# Patient Record
Sex: MALE | Race: WHITE | NOT HISPANIC OR LATINO | Employment: FULL TIME | URBAN - METROPOLITAN AREA
[De-identification: names, ages, dates, MRNs, and addresses within clinical notes are randomized per-mention and may not be internally consistent; named-entity substitution may affect disease eponyms.]

---

## 2017-06-19 ENCOUNTER — OFFICE VISIT (OUTPATIENT)
Dept: FAMILY MEDICINE CLINIC | Facility: CLINIC | Age: 32
End: 2017-06-19

## 2017-06-19 VITALS
SYSTOLIC BLOOD PRESSURE: 136 MMHG | BODY MASS INDEX: 37.25 KG/M2 | DIASTOLIC BLOOD PRESSURE: 82 MMHG | TEMPERATURE: 98 F | HEIGHT: 72 IN | WEIGHT: 275 LBS | HEART RATE: 83 BPM

## 2017-06-19 DIAGNOSIS — M10.071 ACUTE IDIOPATHIC GOUT INVOLVING TOE OF RIGHT FOOT: Primary | ICD-10-CM

## 2017-06-19 PROCEDURE — 99203 OFFICE O/P NEW LOW 30 MIN: CPT | Performed by: INTERNAL MEDICINE

## 2017-06-19 RX ORDER — INDOMETHACIN 50 MG/1
50 CAPSULE ORAL
Qty: 30 CAPSULE | Refills: 2 | Status: SHIPPED | OUTPATIENT
Start: 2017-06-19 | End: 2019-08-08

## 2017-06-19 NOTE — PROGRESS NOTES
Subjective   Marcus Flores is a 32 y.o. male. Patient is here today for   Chief Complaint   Patient presents with   • Gout     left big toe-new patient           Vitals:    06/19/17 0948   BP: 136/82   Pulse:    Temp:        History reviewed. No pertinent past medical history.   No Known Allergies   Social History     Social History   • Marital status: Unknown     Spouse name: N/A   • Number of children: N/A   • Years of education: N/A     Occupational History   • Not on file.     Social History Main Topics   • Smoking status: Never Smoker   • Smokeless tobacco: Not on file   • Alcohol use No   • Drug use: Not on file   • Sexual activity: Not on file     Other Topics Concern   • Not on file     Social History Narrative   • No narrative on file        Current Outpatient Prescriptions:   •  indomethacin (INDOCIN) 50 MG capsule, Take 1 capsule by mouth 3 (Three) Times a Day With Meals., Disp: 30 capsule, Rfl: 2     Objective     HPI Comments: This young man felt like he might have had a bout of gout.  His left medical tarsal phalangeal joint on his great toe is exquisitely tender about 3 weeks ago.  It is less so now.  It never really did get really red.  It did swell a bit.  The swelling is also decreased bed.    He tells me his dad has had trouble with gout in the past.    Gout          Review of Systems   Constitutional: Negative.    HENT: Negative.    Respiratory: Negative.    Cardiovascular: Negative.    Musculoskeletal: Positive for gout.        The only complaint of metatarsal phalangeal joint and the great toe of the left foot having had exquisite pain about 3 weeks ago which is gradually diminished although he continues to her little bit.   Psychiatric/Behavioral: Negative.        Physical Exam   Constitutional: He is oriented to person, place, and time.   Pleasant, neatly groomed, BMI 37.   HENT:   Head: Normocephalic and atraumatic.   Pulmonary/Chest: Effort normal.   Musculoskeletal:   He had a little bit  of erythema and the metatarsal-phalangeal joint of the great toe on the left foot.  A full range of motion here.  There was a bit of pain on palpation but this was not exquisite.  He ambulated without lab.   Neurological: He is alert and oriented to person, place, and time.   Psychiatric: He has a normal mood and affect. His behavior is normal.   Nursing note and vitals reviewed.        Problem List Items Addressed This Visit        Other    Acute idiopathic gout involving toe of right foot - Primary    Relevant Orders    Comprehensive Metabolic Panel    Uric Acid    CBC & Differential            PLAN  I sent a prescription for indomethacin 50 mg 1 by mouth 3 times a day with food when necessary.  I asked him not to take this medication for more than 1-2 weeks at a time when necessary.    He will let me know if his symptoms flare.  In the meantime, I got a uric acid level and a comprehensive metabolic panel.  No Follow-up on file.

## 2017-06-20 LAB
ALBUMIN SERPL-MCNC: 5 G/DL (ref 3.5–5.2)
ALBUMIN/GLOB SERPL: 1.6 G/DL
ALP SERPL-CCNC: 48 U/L (ref 39–117)
ALT SERPL-CCNC: 93 U/L (ref 1–41)
AST SERPL-CCNC: 37 U/L (ref 1–40)
BASOPHILS # BLD AUTO: 0.03 10*3/MM3 (ref 0–0.2)
BASOPHILS NFR BLD AUTO: 0.4 % (ref 0–1.5)
BILIRUB SERPL-MCNC: 0.2 MG/DL (ref 0.1–1.2)
BUN SERPL-MCNC: 11 MG/DL (ref 6–20)
BUN/CREAT SERPL: 10.5 (ref 7–25)
CALCIUM SERPL-MCNC: 10.1 MG/DL (ref 8.6–10.5)
CHLORIDE SERPL-SCNC: 101 MMOL/L (ref 98–107)
CO2 SERPL-SCNC: 26.5 MMOL/L (ref 22–29)
CREAT SERPL-MCNC: 1.05 MG/DL (ref 0.76–1.27)
EOSINOPHIL # BLD AUTO: 0.36 10*3/MM3 (ref 0–0.7)
EOSINOPHIL NFR BLD AUTO: 5.2 % (ref 0.3–6.2)
ERYTHROCYTE [DISTWIDTH] IN BLOOD BY AUTOMATED COUNT: 13.3 % (ref 11.5–14.5)
GLOBULIN SER CALC-MCNC: 3.1 GM/DL
GLUCOSE SERPL-MCNC: 107 MG/DL (ref 65–99)
HCT VFR BLD AUTO: 48.9 % (ref 40.4–52.2)
HGB BLD-MCNC: 16 G/DL (ref 13.7–17.6)
IMM GRANULOCYTES # BLD: 0 10*3/MM3 (ref 0–0.03)
IMM GRANULOCYTES NFR BLD: 0 % (ref 0–0.5)
LYMPHOCYTES # BLD AUTO: 3.06 10*3/MM3 (ref 0.9–4.8)
LYMPHOCYTES NFR BLD AUTO: 44.1 % (ref 19.6–45.3)
MCH RBC QN AUTO: 31.1 PG (ref 27–32.7)
MCHC RBC AUTO-ENTMCNC: 32.7 G/DL (ref 32.6–36.4)
MCV RBC AUTO: 95.1 FL (ref 79.8–96.2)
MONOCYTES # BLD AUTO: 0.44 10*3/MM3 (ref 0.2–1.2)
MONOCYTES NFR BLD AUTO: 6.3 % (ref 5–12)
NEUTROPHILS # BLD AUTO: 3.05 10*3/MM3 (ref 1.9–8.1)
NEUTROPHILS NFR BLD AUTO: 44 % (ref 42.7–76)
PLATELET # BLD AUTO: 281 10*3/MM3 (ref 140–500)
POTASSIUM SERPL-SCNC: 4.6 MMOL/L (ref 3.5–5.2)
PROT SERPL-MCNC: 8.1 G/DL (ref 6–8.5)
RBC # BLD AUTO: 5.14 10*6/MM3 (ref 4.6–6)
SODIUM SERPL-SCNC: 142 MMOL/L (ref 136–145)
URATE SERPL-MCNC: 7 MG/DL (ref 3.4–7)
WBC # BLD AUTO: 6.94 10*3/MM3 (ref 4.5–10.7)

## 2017-11-02 ENCOUNTER — OFFICE VISIT (OUTPATIENT)
Dept: FAMILY MEDICINE CLINIC | Facility: CLINIC | Age: 32
End: 2017-11-02

## 2017-11-02 VITALS
DIASTOLIC BLOOD PRESSURE: 72 MMHG | HEIGHT: 72 IN | TEMPERATURE: 98 F | OXYGEN SATURATION: 98 % | BODY MASS INDEX: 37.93 KG/M2 | HEART RATE: 80 BPM | RESPIRATION RATE: 16 BRPM | SYSTOLIC BLOOD PRESSURE: 124 MMHG | WEIGHT: 280 LBS

## 2017-11-02 DIAGNOSIS — G47.10 HYPERSOMNIA: Primary | ICD-10-CM

## 2017-11-02 DIAGNOSIS — M10.071 ACUTE IDIOPATHIC GOUT INVOLVING TOE OF RIGHT FOOT: ICD-10-CM

## 2017-11-02 DIAGNOSIS — Z23 NEED FOR VACCINATION: ICD-10-CM

## 2017-11-02 DIAGNOSIS — R06.81 WITNESSED EPISODE OF APNEA: ICD-10-CM

## 2017-11-02 PROCEDURE — 99213 OFFICE O/P EST LOW 20 MIN: CPT | Performed by: INTERNAL MEDICINE

## 2017-11-02 PROCEDURE — 90715 TDAP VACCINE 7 YRS/> IM: CPT | Performed by: INTERNAL MEDICINE

## 2017-11-02 PROCEDURE — 90471 IMMUNIZATION ADMIN: CPT | Performed by: INTERNAL MEDICINE

## 2017-11-02 RX ORDER — MELOXICAM 7.5 MG/1
15 TABLET ORAL DAILY
Qty: 60 TABLET | Refills: 1 | Status: SHIPPED | OUTPATIENT
Start: 2017-11-02 | End: 2019-08-08

## 2017-11-02 NOTE — PROGRESS NOTES
Subjective   Marcus Flores is a 32 y.o. male. Patient is here today for   Chief Complaint   Patient presents with   • Gout     right toe pain   • Snoring     would like to discuss getting a sleep study done           Vitals:    11/02/17 0952   BP: 124/72   Pulse: 80   Resp: 16   Temp: 98 °F (36.7 °C)   SpO2: 98%       History reviewed. No pertinent past medical history.   No Known Allergies   Social History     Social History   • Marital status: Unknown     Spouse name: N/A   • Number of children: N/A   • Years of education: N/A     Occupational History   • Not on file.     Social History Main Topics   • Smoking status: Never Smoker   • Smokeless tobacco: Not on file   • Alcohol use No   • Drug use: Not on file   • Sexual activity: Not on file     Other Topics Concern   • Not on file     Social History Narrative        Current Outpatient Prescriptions:   •  indomethacin (INDOCIN) 50 MG capsule, Take 1 capsule by mouth 3 (Three) Times a Day With Meals., Disp: 30 capsule, Rfl: 2  •  meloxicam (MOBIC) 7.5 MG tablet, Take 2 tablets by mouth Daily., Disp: 60 tablet, Rfl: 1     Objective     HPI Comments: He tells me that his girlfriend tells him that he snores and is concerned that he may have obstructive sleep apnea.  He believes that she has witnessed some apnea test.    He notes that he occasionally has pain in his metacarpophalangeal joint of the big toe on left foot.  His been taking indomethacin 1 tablet 2 or 3 times a week since a saw and treated him for gout a few months ago.        Gout   Associated symptoms include fatigue.   Snoring   Associated symptoms include fatigue.        Review of Systems   Constitutional: Positive for fatigue.   Respiratory: Positive for apnea and snoring.    Cardiovascular: Negative.    Genitourinary: Negative.    Musculoskeletal: Positive for gout.        He has occasional pain in the metacarpophalangeal joint of the first of the left foot.   Psychiatric/Behavioral: Negative.         Physical Exam   Constitutional: He is oriented to person, place, and time. He appears well-developed and well-nourished.   Pleasant, neatly groomed, BMI 37.   HENT:   Head: Normocephalic and atraumatic.   Pulmonary/Chest: Effort normal.   Musculoskeletal:   He has some pain on PALPATION of the metacarpophalangeal joint of the first toe on left foot.  He has enlargement of his joint is well   Neurological: He is alert and oriented to person, place, and time.   Psychiatric: He has a normal mood and affect. His behavior is normal.   Nursing note and vitals reviewed.        Problem List Items Addressed This Visit        Respiratory    Witnessed episode of apnea       Musculoskeletal and Integument    Acute idiopathic gout involving toe of right foot      Other Visit Diagnoses     Hypersomnia    -  Primary    Relevant Orders    Ambulatory Referral to Sleep Medicine    Need for vaccination        Relevant Orders    Tdap Vaccine Greater Than or Equal To 8yo IM (Completed)            PLAN  I'm going to arrange for him to get a an old sleep study.    He may have gout in his left great toe but I think it's also just as likely that he has begun to develop some osteoarthritis in the same joint.  I've given him a prescription for meloxicam 7.5 mg one to 2 tablets daily when necessary.    I like him to follow-up to discuss the results of his sleep study when they are available.  No Follow-up on file.

## 2018-01-02 RX ORDER — MELOXICAM 15 MG/1
15 TABLET ORAL DAILY
Qty: 90 TABLET | Refills: 0 | Status: SHIPPED | OUTPATIENT
Start: 2018-01-02 | End: 2019-08-08

## 2019-08-08 ENCOUNTER — OFFICE VISIT (OUTPATIENT)
Dept: FAMILY MEDICINE CLINIC | Facility: CLINIC | Age: 34
End: 2019-08-08

## 2019-08-08 VITALS
SYSTOLIC BLOOD PRESSURE: 144 MMHG | DIASTOLIC BLOOD PRESSURE: 108 MMHG | BODY MASS INDEX: 35.24 KG/M2 | TEMPERATURE: 98.6 F | WEIGHT: 274.6 LBS | RESPIRATION RATE: 16 BRPM | HEIGHT: 74 IN

## 2019-08-08 DIAGNOSIS — R06.83 SNORING: Primary | ICD-10-CM

## 2019-08-08 DIAGNOSIS — R06.81 WITNESSED EPISODE OF APNEA: ICD-10-CM

## 2019-08-08 DIAGNOSIS — M25.552 LEFT HIP PAIN: ICD-10-CM

## 2019-08-08 PROCEDURE — 99213 OFFICE O/P EST LOW 20 MIN: CPT | Performed by: INTERNAL MEDICINE

## 2019-08-08 RX ORDER — DICLOFENAC POTASSIUM 50 MG/1
50 TABLET, FILM COATED ORAL 2 TIMES DAILY
Qty: 60 TABLET | Refills: 1 | Status: SHIPPED | OUTPATIENT
Start: 2019-08-08 | End: 2020-07-17 | Stop reason: SDUPTHER

## 2019-08-16 PROBLEM — M25.552 LEFT HIP PAIN: Status: ACTIVE | Noted: 2019-08-16

## 2019-08-16 NOTE — PROGRESS NOTES
Subjective   Marcus Flores is a 34 y.o. male. Patient is here today for   Chief Complaint   Patient presents with   • Snoring     pt states has been going on for 3 yrs, sleep study order [facility] in office sleep study preferred    • HIP DISCOMFORT     PT STATES HAVING LEFT HIP DISCOMFORT, been going on for 2-3 months           Vitals:    08/08/19 1321   BP: (!) 144/108   Resp: 16   Temp: 98.6 °F (37 °C)       History reviewed. No pertinent past medical history.   No Known Allergies   Social History     Socioeconomic History   • Marital status: Unknown     Spouse name: Not on file   • Number of children: Not on file   • Years of education: Not on file   • Highest education level: Not on file   Tobacco Use   • Smoking status: Never Smoker   • Smokeless tobacco: Never Used   Substance and Sexual Activity   • Alcohol use: No        Current Outpatient Medications:   •  diclofenac (CATAFLAM) 50 MG tablet, Take 1 tablet by mouth 2 (Two) Times a Day., Disp: 60 tablet, Rfl: 1     Objective     Complains of left hip pain for months.    He has been told that he snores loudly.  He has been told that he stops breathing when he is sleeping.  He is constantly tired.         Review of Systems   Constitutional: Positive for fatigue.   Respiratory: Negative.    Cardiovascular: Negative.    Musculoskeletal:        Left hip pain.       Physical Exam   Constitutional: He is oriented to person, place, and time. He appears well-developed and well-nourished.   , Neatly groomed, BMI 35.   HENT:   Head: Normocephalic and atraumatic.   Cardiovascular: Normal rate, regular rhythm and normal heart sounds.   Pulmonary/Chest: Effort normal and breath sounds normal.   Musculoskeletal:   He had some pain on palpation of the left greater trochanter.   Neurological: He is alert and oriented to person, place, and time.   Psychiatric: He has a normal mood and affect. His behavior is normal.   Nursing note and vitals reviewed.        Problem List Items  Addressed This Visit        Respiratory    Witnessed episode of apnea    Relevant Orders    Ambulatory Referral to Sleep Medicine       Nervous and Auditory    Left hip pain      Other Visit Diagnoses     Snoring    -  Primary    Relevant Orders    Ambulatory Referral to Sleep Medicine            PLAN  , Set him up to get a sleep study.    I have asked him to take diclofenac twice daily as needed for his left hip pain which is probably greater trochanteric bursitis.    His blood pressure is a bit elevated today.  At like to have him back to discuss the results of his sleep study when it is available.  No Follow-up on file.

## 2019-08-22 ENCOUNTER — OFFICE VISIT (OUTPATIENT)
Dept: SLEEP MEDICINE | Facility: HOSPITAL | Age: 34
End: 2019-08-22

## 2019-08-22 VITALS
DIASTOLIC BLOOD PRESSURE: 81 MMHG | WEIGHT: 272.6 LBS | HEART RATE: 81 BPM | SYSTOLIC BLOOD PRESSURE: 139 MMHG | OXYGEN SATURATION: 97 % | HEIGHT: 72 IN | BODY MASS INDEX: 36.92 KG/M2

## 2019-08-22 DIAGNOSIS — G47.10 HYPERSOMNIA: Primary | ICD-10-CM

## 2019-08-22 DIAGNOSIS — R06.81 WITNESSED EPISODE OF APNEA: ICD-10-CM

## 2019-08-22 DIAGNOSIS — R06.83 SNORING: ICD-10-CM

## 2019-08-22 PROBLEM — G47.30 OBSERVED SLEEP APNEA: Status: ACTIVE | Noted: 2017-11-02

## 2019-08-22 PROCEDURE — 99244 OFF/OP CNSLTJ NEW/EST MOD 40: CPT | Performed by: INTERNAL MEDICINE

## 2019-08-22 PROCEDURE — G0463 HOSPITAL OUTPT CLINIC VISIT: HCPCS

## 2019-08-22 NOTE — PROGRESS NOTES
[facility]  [address]  [address] [place] [address]  Phone   Fax       Marcus Flores  1985  34 y.o.  male      PCP:Owen Arellano MD  Referring Provider same    Type of service: Initial consult  Date of service: 8/22/2019      Chief Complaint   Patient presents with   • Daytime Sleepiness     sometimes   • Fatigue   • Snoring   • Witnessed Apnea   • Frequent Awakenings     awakens coughing/gasping   • Non-restorative Sleep     restless sleep       History of present illness;  Thank you for asking to see Marcus Flores, 34 y.o.  for evaluation of sleep apnea. The patient was seen today on 8/22/2019 at [facility].  He works for [employer].  He has been told that he has significant snoring and also apnea by his girlfriend.  He feels tired and not rested when he wakes up.    Patient gives the following sleep history.  Sleep schedule:  Bedtime: 1130 PM  Wake time: 8 AM  Normally takes about 20 minutes to fall asleep  Average hours of sleep 8  Number of naps per day 1  When patient wakes up still feels tired and not rested  Snoring yes  Witnessed apneas yes  Have you ever awakened gasping for breath, coughing, choking: Yes      History reviewed. No pertinent past medical history.    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  Tobacco use: No  Alcohol use: 2 to 3/week  Caffeinated drinks: 2-3 energy drinks  Over-the-counter sleeping aid and medications: No  Narcotic medications: No    Family Hx  Family history of sleep apnea brother has sleep apnea  Family History   Problem Relation Age of Onset   • Heart disease Mother    • Hypertension Mother    • Cancer Father    • Glaucoma Father        Medications: reviewed    Current Outpatient Medications:   •  diclofenac (CATAFLAM) 50 MG tablet, Take 1 tablet by mouth 2 (Two) Times a Day., Disp: 60 tablet, Rfl: 1    Review of systems:  [place] Sleepiness Scale: Total score: 4   Positive for snoring, witnessed apneas,  "fatigue and daytime excessive sleepiness,   Negative for shortness of breath, cough, wheezing, chest pain, nausea and vomiting, palpitation, swelling of feet:    Morning headaches: No  Awaken with sore throat or dry mouth : Yes  Leg jerking at night: No  Crawly feeling/urge sensation to move in the legs: No  Teeth grinding: No  Sleepwalking, nightmares, muscle weakness with laughing or anger,sleep paralysis: No  Nasal Congestion: No  Nocturia (how many times/night): 1  Memory Problem: No    Physical exam:  Vitals:    08/22/19 1100   BP: 139/81   BP Location: Left arm   Patient Position: Sitting   Pulse: 81   SpO2: 97%   Weight: 124 kg (272 lb 9.6 oz)   Height: 182.9 cm (72\")    Body mass index is 36.97 kg/m². Neck Circumference: 19 inches  HEENT: Head is atraumatic, normocephalic   Eyes:pupils are round equal and reacting to light and accommodation, conjunctiva normal  Nose:no nasal septal defects or deviation and the nasal passages are clear, no nasal polyps,   Throat: tonsils are not enlarged, tongue normal size, oral airway Mallampati class 3  NECK: No lymphadenopathy, trachea is in the midline, thyroid not enlarged  RESPIRATORY SYSTEM: Breath sounds are equal on both sides, there are no wheezes or crackles  CARDIOVASULAR SYSTEM: Heart sounds are regular rhythm and quan rate, there are no murmurs or thrills  ABDOMEN: Soft, no hepatosplenomegaly, no evidence of ascites  EXTREMITES: No cyanosis, clubbing or edema   NEUROLOGICAL SYSTEM: Oriented x 3, no gross neurological defects, gait normal    Medical records and labs reviewed in [facility] reviewed    Assessment and plan:  · Sleep apnea unspecified, (G47.30) Patient's symptoms and examination is consistent with sleep apnea.  I have talked to the patient about the signs and symptoms of sleep apnea and consequences of untreated sleep apnea. Discussed sleep testing.  I have placed a order in epic for home sleep test.  Patient will have a follow-up after this sleep test " is done.   · Obesity, patient's BMI is Body mass index is 36.97 kg/m².. I have discussed the relationship between weight and sleep apnea. Weight reduction is encouraged.  I will also discussed with the patient diet and exercise.  · Snoring secondary to sleep apnea  · Daytime excessive sleepiness to sleep apnea            Guillaume Diana MD, [facility]  Sleep Medicine.(Board-certified)  [facility]  8/22/2019 ,

## 2019-09-24 ENCOUNTER — HOSPITAL ENCOUNTER (OUTPATIENT)
Dept: SLEEP MEDICINE | Facility: HOSPITAL | Age: 34
Discharge: HOME | End: 2019-09-24
Admitting: INTERNAL MEDICINE

## 2019-09-24 DIAGNOSIS — R06.83 SNORING: ICD-10-CM

## 2019-09-24 DIAGNOSIS — G47.10 HYPERSOMNIA: ICD-10-CM

## 2019-09-24 DIAGNOSIS — R06.81 WITNESSED EPISODE OF APNEA: ICD-10-CM

## 2019-09-24 PROCEDURE — 95806 SLEEP STUDY UNATT&RESP EFFT: CPT

## 2019-09-24 PROCEDURE — 95806 SLEEP STUDY UNATT&RESP EFFT: CPT | Performed by: INTERNAL MEDICINE

## 2019-09-27 ENCOUNTER — TELEPHONE (OUTPATIENT)
Dept: SLEEP MEDICINE | Facility: HOSPITAL | Age: 34
End: 2019-09-27

## 2019-09-27 NOTE — TELEPHONE ENCOUNTER
Spoke with patient about sleep study results, sending orders to [facility], follow up scheduled 11/14/19

## 2019-11-14 ENCOUNTER — OFFICE VISIT (OUTPATIENT)
Dept: SLEEP MEDICINE | Facility: HOSPITAL | Age: 34
End: 2019-11-14

## 2019-11-14 VITALS
WEIGHT: 261 LBS | SYSTOLIC BLOOD PRESSURE: 145 MMHG | BODY MASS INDEX: 35.35 KG/M2 | DIASTOLIC BLOOD PRESSURE: 85 MMHG | HEART RATE: 72 BPM | HEIGHT: 72 IN | OXYGEN SATURATION: 96 %

## 2019-11-14 DIAGNOSIS — Z99.89 OSA ON CPAP: Primary | ICD-10-CM

## 2019-11-14 DIAGNOSIS — G47.33 OSA ON CPAP: Primary | ICD-10-CM

## 2019-11-14 PROBLEM — G47.10 HYPERSOMNIA: Status: RESOLVED | Noted: 2019-08-22 | Resolved: 2019-11-14

## 2019-11-14 PROBLEM — R06.83 SNORING: Status: RESOLVED | Noted: 2019-08-22 | Resolved: 2019-11-14

## 2019-11-14 PROCEDURE — 99213 OFFICE O/P EST LOW 20 MIN: CPT | Performed by: INTERNAL MEDICINE

## 2019-11-14 PROCEDURE — G0463 HOSPITAL OUTPT CLINIC VISIT: HCPCS

## 2019-11-14 NOTE — PROGRESS NOTES
"  [facility]  [address]  3rd [place] [address]  Phone   Fax       SLEEP CLINIC FOLLOW UP PROGRESS NOTE.    Marcus DURANT Mark  1985  34 y.o.  male      PCP: Owen Arellano MD      Date of visit: 11/14/2019    Chief Complaint   Patient presents with   • Sleep Apnea   • Follow-up       INTERM HISTORY:  This is a 34 y.o. years old patient who has a history of sleep apnea and is on CPAP.  Patient reports good compliance with the device.  Patient is using the CPAP and feels much better.  The snoring is resolved and also the hypersomnia is gotten much better.      Sleep schedule  Normally goes to bed at 11:30 PM  Wakes up at 11:30 AM  Feels refreshed after waking up: Yes    The Smart card downloaded on 11/14/2019 has been reviewed by me and shows the following..  Compliance; 97 %  > 4 hr use, 70 %  Average use of the device 4 hours and 24 minutes per night  Residual AHI: 0.0 /hr (normal less than 5)      Past Medical History:   Diagnosis Date   • MILO on CPAP     AHI 21/h       MEDICATIONS: reviewed by me    Current Outpatient Medications:   •  diclofenac (CATAFLAM) 50 MG tablet, Take 1 tablet by mouth 2 (Two) Times a Day., Disp: 60 tablet, Rfl: 1    No Known Allergies reviewed by me    SOCIAL, FAMILY HISTORY: Medical records are reviewed and noted by me.    REVIEW OF SYSTEMS:   [place] Sleepiness Scale :Total score: 3   Snoring: Resolved  Morning headache: No  Nasal congestion: No      PHYSICAL EXAMINATION:  Vitals:    11/14/19 1455   BP: 145/85   Pulse: 72   SpO2: 96%   Weight: 118 kg (261 lb)   Height: 182.9 cm (72\")    Body mass index is 35.4 kg/m².    HEENT: pupils are round equal and reacting to light and accommodation, nasal passage is clear, no nasal polyps, no lymphadenopathy, throat is clear, oral airway Mallampati class 3  RESPRATORY SYSTEM: Breath sounds are equal on both sides and are normal, no wheezes or crackles  CARDIOVASULAR SYSTEM: Heart rate " is regular without murmur  ABDOMEN: Soft, no ascites, no hepatosplenomegaly.  EXTREMITIES: No cyanosis, clubbing or edema       ASSESSMENT AND PLAN:  · Obstructive sleep apnea, patient is using the CPAP with good compliance for treatment of sleep apnea.  I have reviewed the smart card down load and discussed with the patient the download data and encouarged the patient to continue to use the CPAP.  His symptoms have improved and his sleep apnea has improved.  The residual AHI is acceptable.  The device is benefiting the patient.  The patient is also instructed to get the CPAP supplies from the DME company and see me in 1 year for follow-up.  The prescription for supplies has been sent to the DME company.  The DME company is [employer]  · Obesity, patient's BMI is Body mass index is 35.4 kg/m²..  I have discussed weight reduction and the health benefits.  I have also discuss the relationship between the weight and sleep apnea and encouraged the patient to lose weight.        Guillaume Diana MD, [facility]  Sleep Medicine.(Board-certified)  [facility]   11/14/2019

## 2019-12-30 DIAGNOSIS — Z00.00 ROUTINE HEALTH MAINTENANCE: Primary | ICD-10-CM

## 2020-01-09 ENCOUNTER — CLINICAL SUPPORT (OUTPATIENT)
Dept: FAMILY MEDICINE CLINIC | Facility: CLINIC | Age: 35
End: 2020-01-09

## 2020-01-09 DIAGNOSIS — Z00.00 ROUTINE HEALTH MAINTENANCE: Primary | ICD-10-CM

## 2020-01-09 PROCEDURE — 85025 COMPLETE CBC W/AUTO DIFF WBC: CPT | Performed by: INTERNAL MEDICINE

## 2020-01-10 LAB
ALBUMIN SERPL-MCNC: 4.8 G/DL (ref 3.5–5.5)
ALBUMIN/GLOB SERPL: 2 {RATIO} (ref 1.2–2.2)
ALP SERPL-CCNC: 40 IU/L (ref 39–117)
ALT SERPL-CCNC: 43 IU/L (ref 0–44)
APPEARANCE UR: CLEAR
AST SERPL-CCNC: 21 IU/L (ref 0–40)
BILIRUB SERPL-MCNC: 0.2 MG/DL (ref 0–1.2)
BILIRUB UR QL STRIP: NEGATIVE
BUN SERPL-MCNC: 23 MG/DL (ref 6–20)
BUN/CREAT SERPL: 22 (ref 9–20)
CALCIUM SERPL-MCNC: 9.5 MG/DL (ref 8.7–10.2)
CHLORIDE SERPL-SCNC: 105 MMOL/L (ref 96–106)
CHOLEST SERPL-MCNC: 248 MG/DL (ref 100–199)
CO2 SERPL-SCNC: 24 MMOL/L (ref 20–29)
COLOR UR: YELLOW
CREAT SERPL-MCNC: 1.06 MG/DL (ref 0.76–1.27)
GLOBULIN SER CALC-MCNC: 2.4 G/DL (ref 1.5–4.5)
GLUCOSE SERPL-MCNC: 115 MG/DL (ref 65–99)
GLUCOSE UR QL: NEGATIVE
HDLC SERPL-MCNC: 30 MG/DL
HGB UR QL STRIP: NEGATIVE
KETONES UR QL STRIP: NEGATIVE
LDLC SERPL CALC-MCNC: 170 MG/DL (ref 0–99)
LDLC/HDLC SERPL: 5.7 RATIO (ref 0–3.6)
LEUKOCYTE ESTERASE UR QL STRIP: NEGATIVE
MICRO URNS: NORMAL
NITRITE UR QL STRIP: NEGATIVE
PH UR STRIP: 5.5 [PH] (ref 5–7.5)
POTASSIUM SERPL-SCNC: 4.8 MMOL/L (ref 3.5–5.2)
PROT SERPL-MCNC: 7.2 G/DL (ref 6–8.5)
PROT UR QL STRIP: NEGATIVE
SODIUM SERPL-SCNC: 142 MMOL/L (ref 134–144)
SP GR UR: 1.02 (ref 1–1.03)
TRIGL SERPL-MCNC: 240 MG/DL (ref 0–149)
UROBILINOGEN UR STRIP-MCNC: 0.2 MG/DL (ref 0.2–1)
VLDLC SERPL CALC-MCNC: 48 MG/DL (ref 5–40)

## 2020-01-17 ENCOUNTER — OFFICE VISIT (OUTPATIENT)
Dept: FAMILY MEDICINE CLINIC | Facility: CLINIC | Age: 35
End: 2020-01-17

## 2020-01-17 VITALS
HEIGHT: 72 IN | DIASTOLIC BLOOD PRESSURE: 72 MMHG | BODY MASS INDEX: 34.67 KG/M2 | WEIGHT: 256 LBS | OXYGEN SATURATION: 98 % | SYSTOLIC BLOOD PRESSURE: 124 MMHG | TEMPERATURE: 98.6 F | HEART RATE: 71 BPM

## 2020-01-17 DIAGNOSIS — E66.9 OBESITY (BMI 30.0-34.9): ICD-10-CM

## 2020-01-17 DIAGNOSIS — Z99.89 OSA ON CPAP: Primary | ICD-10-CM

## 2020-01-17 DIAGNOSIS — G47.33 OSA ON CPAP: Primary | ICD-10-CM

## 2020-01-17 DIAGNOSIS — R73.9 HYPERGLYCEMIA: ICD-10-CM

## 2020-01-17 PROBLEM — Z00.00 WELL ADULT EXAM: Status: ACTIVE | Noted: 2020-01-17

## 2020-01-17 PROBLEM — E66.811 OBESITY (BMI 30.0-34.9): Status: ACTIVE | Noted: 2020-01-17

## 2020-01-17 PROCEDURE — 99395 PREV VISIT EST AGE 18-39: CPT | Performed by: INTERNAL MEDICINE

## 2020-01-17 NOTE — PROGRESS NOTES
Subjective   Marcus Flores is a 34 y.o. male. Patient is here today for   Chief Complaint   Patient presents with   • Annual Exam     CPE           Vitals:    01/17/20 1045   BP: 124/72   Pulse: 71   Temp: 98.6 °F (37 °C)   SpO2: 98%     Body mass index is 34.71 kg/m².    The following portions of the patient's history were reviewed and updated as appropriate: allergies, current medications, past family history, past medical history, past social history, past surgical history and problem list.    Past Medical History:   Diagnosis Date   • Hip pain, left    • MILO on CPAP     AHI 21/h   • Sleep apnea       Allergies   Allergen Reactions   • Amoxicillin Hives      Social History     Socioeconomic History   • Marital status: Unknown     Spouse name: Not on file   • Number of children: Not on file   • Years of education: Not on file   • Highest education level: Not on file   Tobacco Use   • Smoking status: Never Smoker   • Smokeless tobacco: Never Used   Substance and Sexual Activity   • Alcohol use: Yes     Comment: 3 -4 a wk    • Drug use: Never   • Sexual activity: Defer        Current Outpatient Medications:   •  diclofenac (CATAFLAM) 50 MG tablet, Take 1 tablet by mouth 2 (Two) Times a Day., Disp: 60 tablet, Rfl: 1     EKG  ECG Report   neither electrocardiogram nor a PA and lateral chest x-ray were done.  They were not indicated.      Objective   This pleasant man is here for a comprehensive physical exam.    He has no complaints.     Marcus Flores 34 y.o. male who presents for an Annual Wellness Visit.  he has a history of   Patient Active Problem List   Diagnosis   • Acute idiopathic gout involving toe of right foot   • MILO on CPAP   • Left hip pain   • Well adult exam   • Obesity (BMI 30.0-34.9)   • Hyperglycemia   .  he has been doing well with new interval problems.  Labs results discussed in detail with the patient.  Plan to update vaccines if needed today.      Lab Results (most recent)     None             Review of Systems   Constitutional: Negative.    HENT: Negative.    Respiratory: Negative.    Cardiovascular: Negative.    Musculoskeletal: Negative.    Psychiatric/Behavioral: Negative.    All other systems reviewed and are negative.      Physical Exam   Constitutional: He is oriented to person, place, and time. He appears well-developed and well-nourished. No distress.   Pleasant, neatly groomed, BMI 34.   HENT:   Head: Normocephalic and atraumatic.   Right Ear: External ear normal.   Left Ear: External ear normal.   Nose: Nose normal.   Mouth/Throat: Oropharynx is clear and moist. No oropharyngeal exudate.   Eyes: Pupils are equal, round, and reactive to light. Conjunctivae and EOM are normal. Right eye exhibits no discharge. Left eye exhibits no discharge. No scleral icterus.   Neck: Normal range of motion. Neck supple. No JVD present. No tracheal deviation present. No thyromegaly present.   Cardiovascular: Normal rate, regular rhythm, normal heart sounds and intact distal pulses. Exam reveals no gallop and no friction rub.   No murmur heard.  Pulmonary/Chest: Effort normal and breath sounds normal. No stridor. No respiratory distress. He has no wheezes. He has no rales. He exhibits no tenderness.   Abdominal: Soft. Bowel sounds are normal. He exhibits no distension and no mass. There is no tenderness. There is no rebound and no guarding. No hernia.   Genitourinary: Penis normal. No penile tenderness.   Genitourinary Comments: Prostate and rectal exam deferred.   Musculoskeletal: Normal range of motion. He exhibits no edema, tenderness or deformity.   Lymphadenopathy:     He has no cervical adenopathy.   Neurological: He is alert and oriented to person, place, and time. He displays normal reflexes. No cranial nerve deficit or sensory deficit. He exhibits normal muscle tone. Coordination normal.   Skin: Skin is warm and dry. No rash noted. He is not diaphoretic. No erythema. No pallor.   Psychiatric: He  has a normal mood and affect. His behavior is normal. Judgment and thought content normal.   Nursing note and vitals reviewed.      ASSESSMENT       Problem List Items Addressed This Visit        Respiratory    MILO on CPAP - Primary       Other    Obesity (BMI 30.0-34.9)    Hyperglycemia          PLAN  He and I reviewed his labs.  His glucose is a bit high.  He is obese.  He has obstructive sleep apnea.  He is compliant with use of his CPAP device.    I have asked him to come back to take a look at his lipid profile, comprehensive metabolic panel, CBC, hemoglobin A1c, urinalysis in about 6 months.    He is prediabetic.  He has made an effort to lose some weight recently and has had some success.  He needs to continue with these efforts and to succeed.      There are no Patient Instructions on file for this visit.    No follow-ups on file.

## 2020-06-17 DIAGNOSIS — E78.5 HYPERLIPIDEMIA, UNSPECIFIED HYPERLIPIDEMIA TYPE: ICD-10-CM

## 2020-06-17 DIAGNOSIS — R73.9 HYPERGLYCEMIA: Primary | ICD-10-CM

## 2020-07-11 LAB
ALBUMIN SERPL-MCNC: 5 G/DL (ref 3.5–5.2)
ALBUMIN/GLOB SERPL: 2.4 G/DL
ALP SERPL-CCNC: 33 U/L (ref 39–117)
ALT SERPL-CCNC: 29 U/L (ref 1–41)
AST SERPL-CCNC: 19 U/L (ref 1–40)
BASOPHILS # BLD AUTO: 0.03 10*3/MM3 (ref 0–0.2)
BASOPHILS NFR BLD AUTO: 0.5 % (ref 0–1.5)
BILIRUB SERPL-MCNC: 0.4 MG/DL (ref 0–1.2)
BUN SERPL-MCNC: 17 MG/DL (ref 6–20)
BUN/CREAT SERPL: 15 (ref 7–25)
CALCIUM SERPL-MCNC: 9.4 MG/DL (ref 8.6–10.5)
CHLORIDE SERPL-SCNC: 102 MMOL/L (ref 98–107)
CHOLEST SERPL-MCNC: 211 MG/DL (ref 0–200)
CO2 SERPL-SCNC: 27.4 MMOL/L (ref 22–29)
CREAT SERPL-MCNC: 1.13 MG/DL (ref 0.76–1.27)
EOSINOPHIL # BLD AUTO: 0.36 10*3/MM3 (ref 0–0.4)
EOSINOPHIL NFR BLD AUTO: 6 % (ref 0.3–6.2)
ERYTHROCYTE [DISTWIDTH] IN BLOOD BY AUTOMATED COUNT: 13.3 % (ref 12.3–15.4)
GLOBULIN SER CALC-MCNC: 2.1 GM/DL
GLUCOSE SERPL-MCNC: 115 MG/DL (ref 65–99)
HBA1C MFR BLD: 5.5 % (ref 4.8–5.6)
HCT VFR BLD AUTO: 43.4 % (ref 37.5–51)
HDLC SERPL-MCNC: 34 MG/DL (ref 40–60)
HGB BLD-MCNC: 14.6 G/DL (ref 13–17.7)
IMM GRANULOCYTES # BLD AUTO: 0.01 10*3/MM3 (ref 0–0.05)
IMM GRANULOCYTES NFR BLD AUTO: 0.2 % (ref 0–0.5)
LDLC SERPL CALC-MCNC: 135 MG/DL (ref 0–100)
LDLC/HDLC SERPL: 3.96 {RATIO}
LYMPHOCYTES # BLD AUTO: 2.13 10*3/MM3 (ref 0.7–3.1)
LYMPHOCYTES NFR BLD AUTO: 35.7 % (ref 19.6–45.3)
MCH RBC QN AUTO: 30.2 PG (ref 26.6–33)
MCHC RBC AUTO-ENTMCNC: 33.6 G/DL (ref 31.5–35.7)
MCV RBC AUTO: 89.7 FL (ref 79–97)
MONOCYTES # BLD AUTO: 0.4 10*3/MM3 (ref 0.1–0.9)
MONOCYTES NFR BLD AUTO: 6.7 % (ref 5–12)
NEUTROPHILS # BLD AUTO: 3.04 10*3/MM3 (ref 1.7–7)
NEUTROPHILS NFR BLD AUTO: 50.9 % (ref 42.7–76)
NRBC BLD AUTO-RTO: 0 /100 WBC (ref 0–0.2)
PLATELET # BLD AUTO: 243 10*3/MM3 (ref 140–450)
POTASSIUM SERPL-SCNC: 4.5 MMOL/L (ref 3.5–5.2)
PROT SERPL-MCNC: 7.1 G/DL (ref 6–8.5)
RBC # BLD AUTO: 4.84 10*6/MM3 (ref 4.14–5.8)
SODIUM SERPL-SCNC: 139 MMOL/L (ref 136–145)
TRIGL SERPL-MCNC: 212 MG/DL (ref 0–150)
VLDLC SERPL CALC-MCNC: 42.4 MG/DL
WBC # BLD AUTO: 5.97 10*3/MM3 (ref 3.4–10.8)

## 2020-07-17 ENCOUNTER — OFFICE VISIT (OUTPATIENT)
Dept: FAMILY MEDICINE CLINIC | Facility: CLINIC | Age: 35
End: 2020-07-17

## 2020-07-17 VITALS
DIASTOLIC BLOOD PRESSURE: 78 MMHG | HEART RATE: 72 BPM | SYSTOLIC BLOOD PRESSURE: 132 MMHG | HEIGHT: 72 IN | WEIGHT: 248.8 LBS | TEMPERATURE: 98.7 F | RESPIRATION RATE: 18 BRPM | BODY MASS INDEX: 33.7 KG/M2 | OXYGEN SATURATION: 97 %

## 2020-07-17 DIAGNOSIS — G47.33 OSA ON CPAP: Primary | ICD-10-CM

## 2020-07-17 DIAGNOSIS — E66.9 OBESITY (BMI 30.0-34.9): ICD-10-CM

## 2020-07-17 DIAGNOSIS — R73.9 HYPERGLYCEMIA: ICD-10-CM

## 2020-07-17 DIAGNOSIS — Z99.89 OSA ON CPAP: Primary | ICD-10-CM

## 2020-07-17 PROCEDURE — 99214 OFFICE O/P EST MOD 30 MIN: CPT | Performed by: INTERNAL MEDICINE

## 2020-07-17 RX ORDER — DICLOFENAC POTASSIUM 50 MG/1
50 TABLET, FILM COATED ORAL 2 TIMES DAILY
Qty: 60 TABLET | Refills: 1 | Status: SHIPPED | OUTPATIENT
Start: 2020-07-17

## 2020-07-17 NOTE — PROGRESS NOTES
Subjective   Marcus Flores is a 35 y.o. male. Patient is here today for   Chief Complaint   Patient presents with   • Follow-up     labs- hyperglycemia and gout.           Vitals:    07/17/20 1104   BP: 132/78   Pulse: 72   Resp: 18   Temp: 98.7 °F (37.1 °C)   SpO2: 97%     Body mass index is 33.74 kg/m².      Past Medical History:   Diagnosis Date   • Hip pain, left    • MILO on CPAP     AHI 21/h   • Sleep apnea       Allergies   Allergen Reactions   • Amoxicillin Hives      Social History     Socioeconomic History   • Marital status: Unknown     Spouse name: Not on file   • Number of children: Not on file   • Years of education: Not on file   • Highest education level: Not on file   Tobacco Use   • Smoking status: Never Smoker   • Smokeless tobacco: Never Used   Substance and Sexual Activity   • Alcohol use: Yes     Comment: 3 -4 a wk    • Drug use: Never   • Sexual activity: Defer        Current Outpatient Medications:   •  diclofenac (CATAFLAM) 50 MG tablet, Take 1 tablet by mouth 2 (Two) Times a Day., Disp: 60 tablet, Rfl: 1     Objective     He has obstructive sleep apnea and is compliant with use of CPAP.    When I saw him last, he and I had discussed his obesity and his hyperglycemia.  He has been changing his diet primarily by cutting out  carbohydrates.    He has lost 24 pounds since August last year.       Review of Systems   Constitutional: Negative.    Cardiovascular: Negative.    Musculoskeletal: Negative.    Psychiatric/Behavioral: Negative.        Physical Exam   Constitutional: He is oriented to person, place, and time. He appears well-developed.   Cardiovascular: Normal rate, regular rhythm and normal heart sounds.   Pulmonary/Chest: Effort normal and breath sounds normal.   Neurological: He is alert and oriented to person, place, and time.   Psychiatric: He has a normal mood and affect. His behavior is normal. Thought content normal.   Nursing note and vitals reviewed.        Problem List Items  Addressed This Visit        Respiratory    MILO on CPAP - Primary       Other    Obesity (BMI 30.0-34.9)    Hyperglycemia            PLAN  He has obstructive sleep apnea and is compliant with use of CPAP.    He has had gout in the past and is found diclofenac to be useful.  He requested a refill of this medication.    His hypercholesterolemia and hyperglycemia have improved with his weight loss.    His blood pressure is just a bit higher than I would like it to be.    I would like to see him back in about 6 months.  Of encouraged him to continue weight loss as he has had some success though his rate of weight loss is recently declined.    If his blood pressure is 130/80 or greater at his follow-up visit or his triglycerides are greater than 150 I think him to start him on medications for these issues at that point.    Prior to his visit in 6 months, I would like him to have the following labs: Lipid profile, comprehensive metabolic panel, CBC, hemoglobin A1c, urinalysis.  No follow-ups on file.

## 2020-11-12 ENCOUNTER — APPOINTMENT (OUTPATIENT)
Dept: SLEEP MEDICINE | Facility: HOSPITAL | Age: 35
End: 2020-11-12

## 2020-12-17 ENCOUNTER — OFFICE VISIT (OUTPATIENT)
Dept: SLEEP MEDICINE | Facility: HOSPITAL | Age: 35
End: 2020-12-17

## 2020-12-17 ENCOUNTER — TELEPHONE (OUTPATIENT)
Dept: SLEEP MEDICINE | Facility: HOSPITAL | Age: 35
End: 2020-12-17

## 2020-12-17 VITALS
HEIGHT: 72 IN | SYSTOLIC BLOOD PRESSURE: 155 MMHG | BODY MASS INDEX: 32.1 KG/M2 | WEIGHT: 237 LBS | DIASTOLIC BLOOD PRESSURE: 83 MMHG | OXYGEN SATURATION: 98 % | HEART RATE: 78 BPM

## 2020-12-17 DIAGNOSIS — Z99.89 OSA ON CPAP: Primary | ICD-10-CM

## 2020-12-17 DIAGNOSIS — G47.33 OSA ON CPAP: Primary | ICD-10-CM

## 2020-12-17 DIAGNOSIS — E66.09 CLASS 1 OBESITY DUE TO EXCESS CALORIES WITHOUT SERIOUS COMORBIDITY WITH BODY MASS INDEX (BMI) OF 32.0 TO 32.9 IN ADULT: ICD-10-CM

## 2020-12-17 PROBLEM — E66.811 CLASS 1 OBESITY DUE TO EXCESS CALORIES WITHOUT SERIOUS COMORBIDITY WITH BODY MASS INDEX (BMI) OF 32.0 TO 32.9 IN ADULT: Status: ACTIVE | Noted: 2020-12-17

## 2020-12-17 PROCEDURE — 99213 OFFICE O/P EST LOW 20 MIN: CPT | Performed by: INTERNAL MEDICINE

## 2020-12-17 PROCEDURE — G0463 HOSPITAL OUTPT CLINIC VISIT: HCPCS

## 2020-12-17 NOTE — TELEPHONE ENCOUNTER
Fit patient with a resmed  F30 medium , if he continues to have problems with leaks we will order an N30i small with chin strap, he will be back in 2 to 3 months for follow up

## 2020-12-17 NOTE — PROGRESS NOTES
"  [facility]  [address]  [address] [place] [address]  Phone   Fax       SLEEP CLINIC FOLLOW UP PROGRESS NOTE.    Marcus Flores  1985  35 y.o.  male      PCP: Owen Arellano MD      Date of visit: 12/17/2020    No chief complaint on file.      INTERM HISTORY:  This is a 35 y.o. years old patient who has a history of sleep apnea and is on CPAP.  Patient reports good compliance with the device.  Patient says that he is not able to use the CPAP recently in the last 3 to 4 months because of the ill fitting mask.  During his last visit in the month of December 2019 his compliance was excellent with good resolution of AHI.    Sleep schedule  Normally goes to bed at 11 PM  Wakes up at 8 AM  Do you feel refreshed after waking up ?:  no      The Smart card downloaded on 12/17/2020 has been reviewed by me and shows the following..  Poor compliance  Mask type: Had a mask fitting and his clinic which shows that he might benefit from F 30 medium mask  DME: [facility]      Past Medical History:   Diagnosis Date   • Hip pain, left    • MILO on CPAP     AHI 21/h   • Sleep apnea        MEDICATIONS: reviewed by me    Current Outpatient Medications:   •  diclofenac (CATAFLAM) 50 MG tablet, Take 1 tablet by mouth 2 (Two) Times a Day., Disp: 60 tablet, Rfl: 1    Allergies   Allergen Reactions   • Amoxicillin Hives    reviewed by me    SOCIAL, FAMILY HISTORY: Medical records are reviewed and noted by me.  History of smoking no  History of alcohol use 5  Caffeine use 2-3 soda/coffee    REVIEW OF SYSTEMS:   [place] Sleepiness Scale :Total score: 3   Snoring: No  Morning headache: No  Nasal congestion: No  Leg movements: No  Number of times you wake up once asleep: 2 3  Heart burn no    PHYSICAL EXAMINATION:  Vitals:    12/17/20 1532   BP: 155/83   Pulse: 78   SpO2: 98%   Weight: 108 kg (237 lb)   Height: 182.9 cm (72\")    Body mass index is 32.14 kg/m².    HEENT: pupils are round " equal and reacting to light and accommodation, nasal passage is clear, no nasal polyps, no lymphadenopathy, throat is clear, oral airway Mallampati class 3  RESPRATORY SYSTEM: Breath sounds are equal on both sides and are normal, no wheezes or crackles  CARDIOVASULAR SYSTEM: Heart rate is regular without murmur  ABDOMEN: Soft, no ascites, no hepatosplenomegaly.  EXTREMITIES: No cyanosis, clubbing or edema       ASSESSMENT AND PLAN:  · Obstructive sleep apnea, unfortunately he is not able to use the CPAP.  In the past his compliance was excellent.  Again I have talked to the patient about the importance of using the CPAP to get the full benefit.  He underwent a mask fitting in the clinic which shows that you may benefit from a F 30 fullface mask medium.  I have sent the order to [facility] to get him a mask and I will see him in about 2 months for follow-up.  · Obesity, patient's BMI is Body mass index is 32.14 kg/m²..  I have discussed weight reduction and the health benefits.  I have also discuss the relationship between the weight and sleep apnea and encouraged the patient to lose weight which is beneficial in treating sleep apnea. Discussed diet and exercise with the patient.    · Return to clinic in 2 months for follow-up        Guillaume Diana MD  Sleep Medicine  Medical Director for [facility]  12/17/2020

## 2020-12-30 ENCOUNTER — TELEPHONE (OUTPATIENT)
Dept: SLEEP MEDICINE | Facility: HOSPITAL | Age: 35
End: 2020-12-30

## 2021-01-11 ENCOUNTER — TELEPHONE (OUTPATIENT)
Dept: SLEEP MEDICINE | Facility: HOSPITAL | Age: 36
End: 2021-01-11

## 2021-01-26 DIAGNOSIS — E78.5 HYPERLIPIDEMIA, UNSPECIFIED HYPERLIPIDEMIA TYPE: ICD-10-CM

## 2021-01-26 DIAGNOSIS — R73.9 HYPERGLYCEMIA: Primary | ICD-10-CM

## 2021-02-12 LAB
ALBUMIN SERPL-MCNC: 4.9 G/DL (ref 3.5–5.2)
ALBUMIN/GLOB SERPL: 2.2 G/DL
ALP SERPL-CCNC: 39 U/L (ref 39–117)
ALT SERPL-CCNC: 24 U/L (ref 1–41)
APPEARANCE UR: CLEAR
AST SERPL-CCNC: 23 U/L (ref 1–40)
BACTERIA #/AREA URNS HPF: NORMAL /HPF
BASOPHILS # BLD AUTO: 0.02 10*3/MM3 (ref 0–0.2)
BASOPHILS NFR BLD AUTO: 0.3 % (ref 0–1.5)
BILIRUB SERPL-MCNC: 0.3 MG/DL (ref 0–1.2)
BILIRUB UR QL STRIP: NEGATIVE
BUN SERPL-MCNC: 17 MG/DL (ref 6–20)
BUN/CREAT SERPL: 17.5 (ref 7–25)
CALCIUM SERPL-MCNC: 9.2 MG/DL (ref 8.6–10.5)
CASTS URNS MICRO: NORMAL
CHLORIDE SERPL-SCNC: 101 MMOL/L (ref 98–107)
CHOLEST SERPL-MCNC: 217 MG/DL (ref 0–200)
CO2 SERPL-SCNC: 28.1 MMOL/L (ref 22–29)
COLOR UR: YELLOW
CREAT SERPL-MCNC: 0.97 MG/DL (ref 0.76–1.27)
EOSINOPHIL # BLD AUTO: 0.23 10*3/MM3 (ref 0–0.4)
EOSINOPHIL NFR BLD AUTO: 4 % (ref 0.3–6.2)
EPI CELLS #/AREA URNS HPF: NORMAL /HPF
ERYTHROCYTE [DISTWIDTH] IN BLOOD BY AUTOMATED COUNT: 12.4 % (ref 12.3–15.4)
GLOBULIN SER CALC-MCNC: 2.2 GM/DL
GLUCOSE SERPL-MCNC: 98 MG/DL (ref 65–99)
GLUCOSE UR QL: NEGATIVE
HBA1C MFR BLD: 5.3 % (ref 4.8–5.6)
HCT VFR BLD AUTO: 43.7 % (ref 37.5–51)
HDLC SERPL-MCNC: 36 MG/DL (ref 40–60)
HGB BLD-MCNC: 14.9 G/DL (ref 13–17.7)
HGB UR QL STRIP: NEGATIVE
IMM GRANULOCYTES # BLD AUTO: 0.01 10*3/MM3 (ref 0–0.05)
IMM GRANULOCYTES NFR BLD AUTO: 0.2 % (ref 0–0.5)
KETONES UR QL STRIP: NEGATIVE
LDLC SERPL CALC-MCNC: 146 MG/DL (ref 0–100)
LDLC/HDLC SERPL: 3.97 {RATIO}
LEUKOCYTE ESTERASE UR QL STRIP: NEGATIVE
LYMPHOCYTES # BLD AUTO: 2.4 10*3/MM3 (ref 0.7–3.1)
LYMPHOCYTES NFR BLD AUTO: 41.5 % (ref 19.6–45.3)
MCH RBC QN AUTO: 30.6 PG (ref 26.6–33)
MCHC RBC AUTO-ENTMCNC: 34.1 G/DL (ref 31.5–35.7)
MCV RBC AUTO: 89.7 FL (ref 79–97)
MONOCYTES # BLD AUTO: 0.47 10*3/MM3 (ref 0.1–0.9)
MONOCYTES NFR BLD AUTO: 8.1 % (ref 5–12)
NEUTROPHILS # BLD AUTO: 2.65 10*3/MM3 (ref 1.7–7)
NEUTROPHILS NFR BLD AUTO: 45.9 % (ref 42.7–76)
NITRITE UR QL STRIP: NEGATIVE
NRBC BLD AUTO-RTO: 0 /100 WBC (ref 0–0.2)
PH UR STRIP: 6.5 [PH] (ref 5–8)
PLATELET # BLD AUTO: 231 10*3/MM3 (ref 140–450)
POTASSIUM SERPL-SCNC: 4.1 MMOL/L (ref 3.5–5.2)
PROT SERPL-MCNC: 7.1 G/DL (ref 6–8.5)
PROT UR QL STRIP: NEGATIVE
RBC # BLD AUTO: 4.87 10*6/MM3 (ref 4.14–5.8)
RBC #/AREA URNS HPF: NORMAL /HPF
SODIUM SERPL-SCNC: 140 MMOL/L (ref 136–145)
SP GR UR: 1.01 (ref 1–1.03)
TRIGL SERPL-MCNC: 191 MG/DL (ref 0–150)
UROBILINOGEN UR STRIP-MCNC: NORMAL MG/DL
VLDLC SERPL CALC-MCNC: 35 MG/DL (ref 5–40)
WBC # BLD AUTO: 5.78 10*3/MM3 (ref 3.4–10.8)
WBC #/AREA URNS HPF: NORMAL /HPF

## 2021-02-17 ENCOUNTER — OFFICE VISIT (OUTPATIENT)
Dept: FAMILY MEDICINE CLINIC | Facility: CLINIC | Age: 36
End: 2021-02-17

## 2021-02-17 VITALS
TEMPERATURE: 97.1 F | DIASTOLIC BLOOD PRESSURE: 86 MMHG | WEIGHT: 244.8 LBS | SYSTOLIC BLOOD PRESSURE: 140 MMHG | RESPIRATION RATE: 18 BRPM | BODY MASS INDEX: 33.16 KG/M2 | OXYGEN SATURATION: 98 % | HEIGHT: 72 IN | HEART RATE: 76 BPM

## 2021-02-17 DIAGNOSIS — E78.00 HYPERCHOLESTEROLEMIA: ICD-10-CM

## 2021-02-17 DIAGNOSIS — R03.0 BORDERLINE HYPERTENSION: ICD-10-CM

## 2021-02-17 DIAGNOSIS — Z99.89 OSA ON CPAP: ICD-10-CM

## 2021-02-17 DIAGNOSIS — G47.33 OSA ON CPAP: ICD-10-CM

## 2021-02-17 DIAGNOSIS — E66.9 OBESITY (BMI 30.0-34.9): Primary | ICD-10-CM

## 2021-02-17 PROCEDURE — 99214 OFFICE O/P EST MOD 30 MIN: CPT | Performed by: INTERNAL MEDICINE

## 2021-02-17 NOTE — PROGRESS NOTES
Subjective   Marcus Flores is a 35 y.o. male. Patient is here today for   Chief Complaint   Patient presents with   • Hyperglycemia     6MO FU & LABS          Vitals:    02/17/21 1611   BP: 140/86   Pulse: 76   Resp: 18   Temp: 97.1 °F (36.2 °C)   SpO2: 98%     Body mass index is 33.19 kg/m².      Past Medical History:   Diagnosis Date   • Hip pain, left    • MILO on CPAP     AHI 21/h   • Sleep apnea       Allergies   Allergen Reactions   • Amoxicillin Hives      Social History     Socioeconomic History   • Marital status: Unknown     Spouse name: Not on file   • Number of children: Not on file   • Years of education: Not on file   • Highest education level: Not on file   Tobacco Use   • Smoking status: Never Smoker   • Smokeless tobacco: Never Used   Substance and Sexual Activity   • Alcohol use: Yes     Comment: 3 -4 a wk    • Drug use: Never   • Sexual activity: Defer        Current Outpatient Medications:   •  diclofenac (CATAFLAM) 50 MG tablet, Take 1 tablet by mouth 2 (Two) Times a Day., Disp: 60 tablet, Rfl: 1     Objective     He is here to follow-up on hyperglycemia and lab work done last week.    He tells me that he feels well.    He sees Dr. Diana for management of his obstructive sleep apnea. The patient is compliant with use of CPAP.    Hyperglycemia         Review of Systems   Constitutional: Negative.    Respiratory: Negative.    Cardiovascular: Negative.    Musculoskeletal: Negative.    Psychiatric/Behavioral: Negative.        Physical Exam  Vitals signs and nursing note reviewed.   Constitutional:       Appearance: Normal appearance. He is obese. He is not ill-appearing or diaphoretic.      Comments: Pleasant, neatly groomed, in no distress.   Cardiovascular:      Rate and Rhythm: Normal rate and regular rhythm.      Heart sounds: Normal heart sounds. No murmur. No gallop.    Neurological:      Mental Status: He is alert and oriented to person, place, and time.   Psychiatric:         Mood and  Affect: Mood normal.         Behavior: Behavior normal.         Thought Content: Thought content normal.           Problems Addressed this Visit        Cardiac and Vasculature    Borderline hypertension    Hypercholesterolemia       Endocrine and Metabolic    Obesity (BMI 30.0-34.9) - Primary       Sleep    MILO on CPAP      Diagnoses       Codes Comments    Obesity (BMI 30.0-34.9)    -  Primary ICD-10-CM: E66.9  ICD-9-CM: 278.00     MILO on CPAP     ICD-10-CM: G47.33, Z99.89  ICD-9-CM: 327.23, V46.8     Hypercholesterolemia     ICD-10-CM: E78.00  ICD-9-CM: 272.0     Borderline hypertension     ICD-10-CM: R03.0  ICD-9-CM: 796.2             PLAN  He and I reviewed his labs. His LDL cholesterol is a bit too high. It is greater than 140. He does not smoke cigarettes. He is only 35 years old. I asked him to do his best to manage his cholesterol by following some patient education materials that he and I reviewed on [email or web address].    He is obese with a BMI of 33. He used to weigh 30 to 40 pounds more. He still needs to lose weight and if he could, his blood pressures may respond, his cholesterol may respond.    He has obstructive sleep apnea and is compliant with use of CPAP. He does not feel that he is getting quite the benefit that he might get out of this CPAP mask. He is going to be discussing this with Dr. Diana.    I asked him to follow-up in about 6 months. Mostly for blood pressure check.    Also, he needs to make an appointment eventually to be seen for a comprehensive physical exam.    No follow-ups on file.

## 2021-03-18 ENCOUNTER — APPOINTMENT (OUTPATIENT)
Dept: SLEEP MEDICINE | Facility: HOSPITAL | Age: 36
End: 2021-03-18

## 2021-04-22 ENCOUNTER — APPOINTMENT (OUTPATIENT)
Dept: SLEEP MEDICINE | Facility: HOSPITAL | Age: 36
End: 2021-04-22

## 2021-05-27 ENCOUNTER — APPOINTMENT (OUTPATIENT)
Dept: SLEEP MEDICINE | Facility: HOSPITAL | Age: 36
End: 2021-05-27

## 2021-08-23 DIAGNOSIS — R73.9 HYPERGLYCEMIA: ICD-10-CM

## 2021-08-23 DIAGNOSIS — E78.00 HYPERCHOLESTEROLEMIA: Primary | ICD-10-CM

## 2021-08-25 LAB
ALBUMIN SERPL-MCNC: 4.8 G/DL (ref 3.5–5.2)
ALBUMIN/GLOB SERPL: 1.8 G/DL
ALP SERPL-CCNC: 42 U/L (ref 39–117)
ALT SERPL-CCNC: 30 U/L (ref 1–41)
APPEARANCE UR: CLEAR
AST SERPL-CCNC: 22 U/L (ref 1–40)
BACTERIA #/AREA URNS HPF: NORMAL /HPF
BASOPHILS # BLD AUTO: 0.03 10*3/MM3 (ref 0–0.2)
BASOPHILS NFR BLD AUTO: 0.5 % (ref 0–1.5)
BILIRUB SERPL-MCNC: 0.3 MG/DL (ref 0–1.2)
BILIRUB UR QL STRIP: NEGATIVE
BUN SERPL-MCNC: 18 MG/DL (ref 6–20)
BUN/CREAT SERPL: 16.5 (ref 7–25)
CALCIUM SERPL-MCNC: 9.9 MG/DL (ref 8.6–10.5)
CASTS URNS MICRO: NORMAL
CHLORIDE SERPL-SCNC: 105 MMOL/L (ref 98–107)
CHOLEST SERPL-MCNC: 220 MG/DL (ref 0–200)
CO2 SERPL-SCNC: 24.7 MMOL/L (ref 22–29)
COLOR UR: YELLOW
CREAT SERPL-MCNC: 1.09 MG/DL (ref 0.76–1.27)
EOSINOPHIL # BLD AUTO: 0.27 10*3/MM3 (ref 0–0.4)
EOSINOPHIL NFR BLD AUTO: 4.9 % (ref 0.3–6.2)
EPI CELLS #/AREA URNS HPF: NORMAL /HPF
ERYTHROCYTE [DISTWIDTH] IN BLOOD BY AUTOMATED COUNT: 12.4 % (ref 12.3–15.4)
GLOBULIN SER CALC-MCNC: 2.7 GM/DL
GLUCOSE SERPL-MCNC: 109 MG/DL (ref 65–99)
GLUCOSE UR QL: NEGATIVE
HBA1C MFR BLD: 5.4 % (ref 4.8–5.6)
HCT VFR BLD AUTO: 45 % (ref 37.5–51)
HDLC SERPL-MCNC: 39 MG/DL (ref 40–60)
HGB BLD-MCNC: 14.9 G/DL (ref 13–17.7)
HGB UR QL STRIP: NEGATIVE
IMM GRANULOCYTES # BLD AUTO: 0.01 10*3/MM3 (ref 0–0.05)
IMM GRANULOCYTES NFR BLD AUTO: 0.2 % (ref 0–0.5)
KETONES UR QL STRIP: NEGATIVE
LDLC SERPL CALC-MCNC: 150 MG/DL (ref 0–100)
LDLC/HDLC SERPL: 3.76 {RATIO}
LEUKOCYTE ESTERASE UR QL STRIP: NEGATIVE
LYMPHOCYTES # BLD AUTO: 2.27 10*3/MM3 (ref 0.7–3.1)
LYMPHOCYTES NFR BLD AUTO: 41.1 % (ref 19.6–45.3)
MCH RBC QN AUTO: 29.6 PG (ref 26.6–33)
MCHC RBC AUTO-ENTMCNC: 33.1 G/DL (ref 31.5–35.7)
MCV RBC AUTO: 89.3 FL (ref 79–97)
MONOCYTES # BLD AUTO: 0.4 10*3/MM3 (ref 0.1–0.9)
MONOCYTES NFR BLD AUTO: 7.2 % (ref 5–12)
NEUTROPHILS # BLD AUTO: 2.54 10*3/MM3 (ref 1.7–7)
NEUTROPHILS NFR BLD AUTO: 46.1 % (ref 42.7–76)
NITRITE UR QL STRIP: NEGATIVE
NRBC BLD AUTO-RTO: 0 /100 WBC (ref 0–0.2)
PH UR STRIP: 6 [PH] (ref 5–8)
PLATELET # BLD AUTO: 223 10*3/MM3 (ref 140–450)
POTASSIUM SERPL-SCNC: 4.1 MMOL/L (ref 3.5–5.2)
PROT SERPL-MCNC: 7.5 G/DL (ref 6–8.5)
PROT UR QL STRIP: NEGATIVE
RBC # BLD AUTO: 5.04 10*6/MM3 (ref 4.14–5.8)
RBC #/AREA URNS HPF: NORMAL /HPF
SODIUM SERPL-SCNC: 140 MMOL/L (ref 136–145)
SP GR UR: 1.02 (ref 1–1.03)
TRIGL SERPL-MCNC: 171 MG/DL (ref 0–150)
UROBILINOGEN UR STRIP-MCNC: NORMAL MG/DL
VLDLC SERPL CALC-MCNC: 31 MG/DL (ref 5–40)
WBC # BLD AUTO: 5.52 10*3/MM3 (ref 3.4–10.8)
WBC #/AREA URNS HPF: NORMAL /HPF

## 2021-08-30 ENCOUNTER — OFFICE VISIT (OUTPATIENT)
Dept: FAMILY MEDICINE CLINIC | Facility: CLINIC | Age: 36
End: 2021-08-30

## 2021-08-30 VITALS
OXYGEN SATURATION: 97 % | WEIGHT: 238 LBS | RESPIRATION RATE: 18 BRPM | HEART RATE: 71 BPM | DIASTOLIC BLOOD PRESSURE: 80 MMHG | HEIGHT: 72 IN | SYSTOLIC BLOOD PRESSURE: 130 MMHG | TEMPERATURE: 98.1 F | BODY MASS INDEX: 32.23 KG/M2

## 2021-08-30 DIAGNOSIS — E66.9 OBESITY (BMI 30.0-34.9): ICD-10-CM

## 2021-08-30 DIAGNOSIS — G47.10 HYPERSOMNIA: ICD-10-CM

## 2021-08-30 DIAGNOSIS — R73.9 HYPERGLYCEMIA: ICD-10-CM

## 2021-08-30 DIAGNOSIS — I10 ESSENTIAL HYPERTENSION: Primary | ICD-10-CM

## 2021-08-30 PROCEDURE — 99214 OFFICE O/P EST MOD 30 MIN: CPT | Performed by: INTERNAL MEDICINE

## 2021-08-30 RX ORDER — LISINOPRIL 5 MG/1
5 TABLET ORAL DAILY
Qty: 90 TABLET | Refills: 3 | Status: SHIPPED | OUTPATIENT
Start: 2021-08-30 | End: 2022-01-24

## 2021-09-03 ENCOUNTER — TELEPHONE (OUTPATIENT)
Dept: FAMILY MEDICINE CLINIC | Facility: CLINIC | Age: 36
End: 2021-09-03

## 2021-09-03 NOTE — TELEPHONE ENCOUNTER
Caller: Marcus Flores    Relationship to patient: Self    Best call back number: [phone]    Patient is needing: SCHEDULE FOR LABS IN 4 MONTHS. ATTEMPTED TO WARM TRANSFER PATIENT, NO ANSWER.

## 2021-09-07 PROBLEM — I10 ESSENTIAL HYPERTENSION: Status: ACTIVE | Noted: 2021-09-07

## 2021-09-07 NOTE — PROGRESS NOTES
Subjective   Marcsu Flores is a 36 y.o. male. Patient is here today for   Chief Complaint   Patient presents with   • Follow-up     6MO AND LAB FOLLOW UP           Vitals:    08/30/21 1605   BP: 130/80   Pulse: 71   Resp: 18   Temp: 98.1 °F (36.7 °C)   SpO2: 97%     Body mass index is 32.27 kg/m².      Past Medical History:   Diagnosis Date   • Hip pain, left    • MILO on CPAP     AHI 21/h   • Sleep apnea       Allergies   Allergen Reactions   • Amoxicillin Hives      Social History     Socioeconomic History   • Marital status: Unknown     Spouse name: Not on file   • Number of children: Not on file   • Years of education: Not on file   • Highest education level: Not on file   Tobacco Use   • Smoking status: Never Smoker   • Smokeless tobacco: Never Used   Vaping Use   • Vaping Use: Never used   Substance and Sexual Activity   • Alcohol use: Yes     Comment: 3 -4 a wk    • Drug use: Never   • Sexual activity: Defer        Current Outpatient Medications:   •  diclofenac (CATAFLAM) 50 MG tablet, Take 1 tablet by mouth 2 (Two) Times a Day. (Patient taking differently: Take 50 mg by mouth 2 (Two) Times a Day. As needed), Disp: 60 tablet, Rfl: 1  •  lisinopril (PRINIVIL,ZESTRIL) 5 MG tablet, Take 1 tablet by mouth Daily., Disp: 90 tablet, Rfl: 3     Objective     He is here to follow-up on labs done last week and borderline hypertension.    He tells me that he feels well.       Review of Systems   Constitutional: Negative.    HENT: Negative.    Respiratory: Negative.    Cardiovascular: Negative.    Musculoskeletal: Negative.    Psychiatric/Behavioral: Negative.        Physical Exam  Vitals and nursing note reviewed.   Constitutional:       Appearance: Normal appearance. He is obese.      Comments: Pleasant, neatly groomed, in no distress.   Cardiovascular:      Rate and Rhythm: Regular rhythm.      Heart sounds: Normal heart sounds. No murmur heard.   No gallop.    Neurological:      Mental Status: He is alert and  oriented to person, place, and time.   Psychiatric:         Mood and Affect: Mood normal.         Behavior: Behavior normal.         Thought Content: Thought content normal.           Problems Addressed this Visit        Cardiac and Vasculature    Essential hypertension - Primary    Relevant Medications    lisinopril (PRINIVIL,ZESTRIL) 5 MG tablet       Endocrine and Metabolic    Obesity (BMI 30.0-34.9)    Hyperglycemia       Sleep    RESOLVED: Hypersomnia      Diagnoses       Codes Comments    Essential hypertension    -  Primary ICD-10-CM: I10  ICD-9-CM: 401.9     Obesity (BMI 30.0-34.9)     ICD-10-CM: E66.9  ICD-9-CM: 278.00     Hypersomnia     ICD-10-CM: G47.10  ICD-9-CM: 780.54     Hyperglycemia     ICD-10-CM: R73.9  ICD-9-CM: 790.29             PLAN  His blood pressure is a bit too high.  I started him on lisinopril 5 mg daily.  Like him back in 3 to 4 months to check a comprehensive metabolic panel about a week before.    He has hyperglycemia/prediabetes.    He is obese and encouraged weight loss via regular aerobic activity and decrease caloric intake.    He has obstructive sleep apnea and he uses a CPAP machine.      No follow-ups on file.

## 2022-01-19 DIAGNOSIS — E78.00 HYPERCHOLESTEROLEMIA: Primary | ICD-10-CM

## 2022-01-19 DIAGNOSIS — R73.9 HYPERGLYCEMIA: ICD-10-CM

## 2022-01-21 LAB
ALBUMIN SERPL-MCNC: 4.9 G/DL (ref 4–5)
ALBUMIN/GLOB SERPL: 2 {RATIO} (ref 1.2–2.2)
ALP SERPL-CCNC: 44 IU/L (ref 44–121)
ALT SERPL-CCNC: 29 IU/L (ref 0–44)
APPEARANCE UR: CLEAR
AST SERPL-CCNC: 22 IU/L (ref 0–40)
BACTERIA #/AREA URNS HPF: NORMAL /[HPF]
BASOPHILS # BLD AUTO: 0 X10E3/UL (ref 0–0.2)
BASOPHILS NFR BLD AUTO: 1 %
BILIRUB SERPL-MCNC: 0.3 MG/DL (ref 0–1.2)
BILIRUB UR QL STRIP: NEGATIVE
BUN SERPL-MCNC: 20 MG/DL (ref 6–20)
BUN/CREAT SERPL: 17 (ref 9–20)
CALCIUM SERPL-MCNC: 9.6 MG/DL (ref 8.7–10.2)
CASTS URNS QL MICRO: NORMAL /LPF
CHLORIDE SERPL-SCNC: 102 MMOL/L (ref 96–106)
CHOLEST SERPL-MCNC: 229 MG/DL (ref 100–199)
CO2 SERPL-SCNC: 24 MMOL/L (ref 20–29)
COLOR UR: YELLOW
CREAT SERPL-MCNC: 1.17 MG/DL (ref 0.76–1.27)
EOSINOPHIL # BLD AUTO: 0.3 X10E3/UL (ref 0–0.4)
EOSINOPHIL NFR BLD AUTO: 5 %
EPI CELLS #/AREA URNS HPF: NORMAL /HPF (ref 0–10)
ERYTHROCYTE [DISTWIDTH] IN BLOOD BY AUTOMATED COUNT: 12.7 % (ref 11.6–15.4)
GLOBULIN SER CALC-MCNC: 2.4 G/DL (ref 1.5–4.5)
GLUCOSE SERPL-MCNC: 106 MG/DL (ref 65–99)
GLUCOSE UR QL STRIP: NEGATIVE
HBA1C MFR BLD: 5.7 % (ref 4.8–5.6)
HCT VFR BLD AUTO: 44.1 % (ref 37.5–51)
HDLC SERPL-MCNC: 33 MG/DL
HGB BLD-MCNC: 15 G/DL (ref 13–17.7)
HGB UR QL STRIP: NEGATIVE
IMM GRANULOCYTES # BLD AUTO: 0 X10E3/UL (ref 0–0.1)
IMM GRANULOCYTES NFR BLD AUTO: 0 %
KETONES UR QL STRIP: NEGATIVE
LDLC SERPL CALC-MCNC: 146 MG/DL (ref 0–99)
LDLC/HDLC SERPL: 4.4 RATIO (ref 0–3.6)
LEUKOCYTE ESTERASE UR QL STRIP: NEGATIVE
LYMPHOCYTES # BLD AUTO: 2.4 X10E3/UL (ref 0.7–3.1)
LYMPHOCYTES NFR BLD AUTO: 38 %
MCH RBC QN AUTO: 30.4 PG (ref 26.6–33)
MCHC RBC AUTO-ENTMCNC: 34 G/DL (ref 31.5–35.7)
MCV RBC AUTO: 90 FL (ref 79–97)
MICRO URNS: NORMAL
MICRO URNS: NORMAL
MONOCYTES # BLD AUTO: 0.6 X10E3/UL (ref 0.1–0.9)
MONOCYTES NFR BLD AUTO: 10 %
NEUTROPHILS # BLD AUTO: 3 X10E3/UL (ref 1.4–7)
NEUTROPHILS NFR BLD AUTO: 46 %
NITRITE UR QL STRIP: NEGATIVE
PH UR STRIP: 6 [PH] (ref 5–7.5)
PLATELET # BLD AUTO: 248 X10E3/UL (ref 150–450)
POTASSIUM SERPL-SCNC: 4.4 MMOL/L (ref 3.5–5.2)
PROT SERPL-MCNC: 7.3 G/DL (ref 6–8.5)
PROT UR QL STRIP: NEGATIVE
RBC # BLD AUTO: 4.93 X10E6/UL (ref 4.14–5.8)
RBC #/AREA URNS HPF: NORMAL /HPF (ref 0–2)
SODIUM SERPL-SCNC: 139 MMOL/L (ref 134–144)
SP GR UR STRIP: 1.01 (ref 1–1.03)
TRIGL SERPL-MCNC: 272 MG/DL (ref 0–149)
UROBILINOGEN UR STRIP-MCNC: 0.2 MG/DL (ref 0.2–1)
VLDLC SERPL CALC-MCNC: 50 MG/DL (ref 5–40)
WBC # BLD AUTO: 6.3 X10E3/UL (ref 3.4–10.8)
WBC #/AREA URNS HPF: NORMAL /HPF (ref 0–5)

## 2022-01-24 ENCOUNTER — OFFICE VISIT (OUTPATIENT)
Dept: FAMILY MEDICINE CLINIC | Facility: CLINIC | Age: 37
End: 2022-01-24

## 2022-01-24 VITALS
SYSTOLIC BLOOD PRESSURE: 132 MMHG | BODY MASS INDEX: 32.91 KG/M2 | RESPIRATION RATE: 18 BRPM | OXYGEN SATURATION: 98 % | HEIGHT: 72 IN | DIASTOLIC BLOOD PRESSURE: 80 MMHG | WEIGHT: 243 LBS | HEART RATE: 76 BPM | TEMPERATURE: 97.1 F

## 2022-01-24 DIAGNOSIS — R41.840 CONCENTRATION DEFICIT: ICD-10-CM

## 2022-01-24 DIAGNOSIS — E66.9 OBESITY (BMI 30.0-34.9): ICD-10-CM

## 2022-01-24 DIAGNOSIS — G47.33 OBSTRUCTIVE SLEEP APNEA SYNDROME: Primary | ICD-10-CM

## 2022-01-24 DIAGNOSIS — I10 ESSENTIAL HYPERTENSION: ICD-10-CM

## 2022-01-24 PROBLEM — R03.0 BORDERLINE HYPERTENSION: Status: RESOLVED | Noted: 2021-02-17 | Resolved: 2022-01-24

## 2022-01-24 PROCEDURE — 99213 OFFICE O/P EST LOW 20 MIN: CPT | Performed by: INTERNAL MEDICINE

## 2022-01-24 RX ORDER — LISINOPRIL AND HYDROCHLOROTHIAZIDE 12.5; 1 MG/1; MG/1
1 TABLET ORAL DAILY
Qty: 30 TABLET | Refills: 3 | Status: SHIPPED | OUTPATIENT
Start: 2022-01-24 | End: 2022-02-18

## 2022-01-24 NOTE — PROGRESS NOTES
Subjective  Answers for HPI/ROS submitted by the patient on 1/18/2022  What is the primary reason for your visit?: Other  Please describe your symptoms.: 4-month followup on Lisinoprol for blood pressure, Also want to ask about congestion at night and possibly add (is that the right term??)  Have you had these symptoms before?: Yes  How long have you been having these symptoms?: Greater than 2 weeks  Please list any medications you are currently taking for this condition.: low-dose Lisinoprol for HBD, none for congestion or attention      Marcus Flores is a 36 y.o. male. Patient is here today for   Chief Complaint   Patient presents with   • Hypertension     follow up on med change, sinus at night, discuss possible add          Vitals:    01/24/22 1444   BP: 132/80   Pulse: 76   Resp: 18   Temp: 97.1 °F (36.2 °C)   SpO2: 98%     Body mass index is 32.95 kg/m².      Past Medical History:   Diagnosis Date   • Hip pain, left    • MILO on CPAP     AHI 21/h   • Sleep apnea       Allergies   Allergen Reactions   • Amoxicillin Hives      Social History     Socioeconomic History   • Marital status: Unknown   Tobacco Use   • Smoking status: Never Smoker   • Smokeless tobacco: Never Used   Vaping Use   • Vaping Use: Never used   Substance and Sexual Activity   • Alcohol use: Yes     Comment: 3 -4 a wk    • Drug use: Never   • Sexual activity: Defer        Current Outpatient Medications:   •  diclofenac (CATAFLAM) 50 MG tablet, Take 1 tablet by mouth 2 (Two) Times a Day. (Patient taking differently: Take 50 mg by mouth 2 (Two) Times a Day. As needed), Disp: 60 tablet, Rfl: 1  •  lisinopril (PRINIVIL,ZESTRIL) 5 MG tablet, Take 1 tablet by mouth Daily., Disp: 90 tablet, Rfl: 3     Objective     He has obstructive sleep apnea but was unable to tolerate CPAP mask.    He has nasal congestion that is worse when he lays down and when he sitting up.  He wonders if he should try taking Afrin or Sudafed.    He is here today to  follow-up on hypertension.    Like to be tested for attention deficit disorder.           Review of Systems   Constitutional: Negative.    HENT: Positive for congestion.    Respiratory: Negative.    Cardiovascular: Negative.    Musculoskeletal: Negative.    Psychiatric/Behavioral:        He has difficulty concentrating.  He wondered if he may have ADD.       Physical Exam  Vitals and nursing note reviewed.   Constitutional:       Appearance: Normal appearance.      Comments: Pleasant, neatly groomed.  In no distress.   Neurological:      Mental Status: He is alert and oriented to person, place, and time.   Psychiatric:         Mood and Affect: Mood normal.         Behavior: Behavior normal.         Thought Content: Thought content normal.           Problems Addressed this Visit        Cardiac and Vasculature    Essential hypertension       Endocrine and Metabolic    Obesity (BMI 30.0-34.9)       Sleep    Sleep apnea - Primary      Diagnoses       Codes Comments    Obstructive sleep apnea syndrome    -  Primary ICD-10-CM: G47.33  ICD-9-CM: 327.23     Obesity (BMI 30.0-34.9)     ICD-10-CM: E66.9  ICD-9-CM: 278.00     Essential hypertension     ICD-10-CM: I10  ICD-9-CM: 401.9             PLAN  Hypertension is poorly controlled.  Asked him to stop taking lisinopril 5 mg daily.  I increase that to lisinopril 10 mg and hydrochlorothiazide 12.5 mg 1 p.o. daily.    He has obstructive sleep apnea which is currently not being treated.  Is going to make a referral for him to follow-up with sleep medicine but he asked me to delay that referral for now.    He was sent him for neuropsychological evaluation regarding the possibility of having ADD.    Like him back in 2 to 3 months to recheck his blood pressure.  That visit should be for an annual physical exam as well.  Fasting labs prior to that visit should include: Lipid profile, comprehensive metabolic panel, CBC, urinalysis, hemoglobin A1c, B12, vitamin D, TSH with reflex  free T4.  No follow-ups on file.

## 2022-02-18 RX ORDER — LISINOPRIL AND HYDROCHLOROTHIAZIDE 12.5; 1 MG/1; MG/1
TABLET ORAL
Qty: 30 TABLET | Refills: 3 | Status: SHIPPED | OUTPATIENT
Start: 2022-02-18 | End: 2022-05-25

## 2022-04-13 DIAGNOSIS — Z13.21 ENCOUNTER FOR VITAMIN DEFICIENCY SCREENING: ICD-10-CM

## 2022-04-13 DIAGNOSIS — E78.00 HYPERCHOLESTEROLEMIA: Primary | ICD-10-CM

## 2022-04-13 DIAGNOSIS — E53.8 VITAMIN B12 DEFICIENCY: ICD-10-CM

## 2022-04-13 DIAGNOSIS — Z13.29 THYROID DISORDER SCREEN: ICD-10-CM

## 2022-04-13 DIAGNOSIS — R73.9 HYPERGLYCEMIA: ICD-10-CM

## 2022-04-14 ENCOUNTER — LAB (OUTPATIENT)
Dept: LAB | Facility: HOSPITAL | Age: 37
End: 2022-04-14

## 2022-04-14 LAB
25(OH)D3 SERPL-MCNC: 31.5 NG/ML (ref 30–100)
ALBUMIN SERPL-MCNC: 4.9 G/DL (ref 3.5–5.2)
ALBUMIN/GLOB SERPL: 1.8 G/DL
ALP SERPL-CCNC: 39 U/L (ref 39–117)
ALT SERPL W P-5'-P-CCNC: 32 U/L (ref 1–41)
ANION GAP SERPL CALCULATED.3IONS-SCNC: 10 MMOL/L (ref 5–15)
AST SERPL-CCNC: 24 U/L (ref 1–40)
BACTERIA UR QL AUTO: NORMAL /HPF
BASOPHILS # BLD AUTO: 0.02 10*3/MM3 (ref 0–0.2)
BASOPHILS NFR BLD AUTO: 0.3 % (ref 0–1.5)
BILIRUB SERPL-MCNC: 0.2 MG/DL (ref 0–1.2)
BILIRUB UR QL STRIP: NEGATIVE
BUN SERPL-MCNC: 14 MG/DL (ref 6–20)
BUN/CREAT SERPL: 13.5 (ref 7–25)
CALCIUM SPEC-SCNC: 9.5 MG/DL (ref 8.6–10.5)
CHLORIDE SERPL-SCNC: 103 MMOL/L (ref 98–107)
CHOLEST SERPL-MCNC: 236 MG/DL (ref 0–200)
CLARITY UR: CLEAR
CO2 SERPL-SCNC: 25 MMOL/L (ref 22–29)
COLOR UR: YELLOW
CREAT SERPL-MCNC: 1.04 MG/DL (ref 0.76–1.27)
DEPRECATED RDW RBC AUTO: 41.1 FL (ref 37–54)
EGFRCR SERPLBLD CKD-EPI 2021: 93.7 ML/MIN/1.73
EOSINOPHIL # BLD AUTO: 0.2 10*3/MM3 (ref 0–0.4)
EOSINOPHIL NFR BLD AUTO: 2.9 % (ref 0.3–6.2)
ERYTHROCYTE [DISTWIDTH] IN BLOOD BY AUTOMATED COUNT: 12.9 % (ref 12.3–15.4)
GLOBULIN UR ELPH-MCNC: 2.7 GM/DL
GLUCOSE SERPL-MCNC: 104 MG/DL (ref 65–99)
GLUCOSE UR STRIP-MCNC: NEGATIVE MG/DL
HBA1C MFR BLD: 5.6 % (ref 4.8–5.6)
HCT VFR BLD AUTO: 43 % (ref 37.5–51)
HDLC SERPL-MCNC: 30 MG/DL (ref 40–60)
HGB BLD-MCNC: 14.8 G/DL (ref 13–17.7)
HGB UR QL STRIP.AUTO: NEGATIVE
HYALINE CASTS UR QL AUTO: NORMAL /LPF
IMM GRANULOCYTES # BLD AUTO: 0.01 10*3/MM3 (ref 0–0.05)
IMM GRANULOCYTES NFR BLD AUTO: 0.1 % (ref 0–0.5)
KETONES UR QL STRIP: NEGATIVE
LDLC SERPL CALC-MCNC: 149 MG/DL (ref 0–100)
LDLC/HDLC SERPL: 4.83 {RATIO}
LEUKOCYTE ESTERASE UR QL STRIP.AUTO: NEGATIVE
LYMPHOCYTES # BLD AUTO: 2.61 10*3/MM3 (ref 0.7–3.1)
LYMPHOCYTES NFR BLD AUTO: 38.4 % (ref 19.6–45.3)
MCH RBC QN AUTO: 30.4 PG (ref 26.6–33)
MCHC RBC AUTO-ENTMCNC: 34.4 G/DL (ref 31.5–35.7)
MCV RBC AUTO: 88.3 FL (ref 79–97)
MONOCYTES # BLD AUTO: 0.59 10*3/MM3 (ref 0.1–0.9)
MONOCYTES NFR BLD AUTO: 8.7 % (ref 5–12)
NEUTROPHILS NFR BLD AUTO: 3.37 10*3/MM3 (ref 1.7–7)
NEUTROPHILS NFR BLD AUTO: 49.6 % (ref 42.7–76)
NITRITE UR QL STRIP: NEGATIVE
NRBC BLD AUTO-RTO: 0 /100 WBC (ref 0–0.2)
PH UR STRIP.AUTO: 6.5 [PH] (ref 5–8)
PLATELET # BLD AUTO: 255 10*3/MM3 (ref 140–450)
PMV BLD AUTO: 10.6 FL (ref 6–12)
POTASSIUM SERPL-SCNC: 4 MMOL/L (ref 3.5–5.2)
PROT SERPL-MCNC: 7.6 G/DL (ref 6–8.5)
PROT UR QL STRIP: NEGATIVE
RBC # BLD AUTO: 4.87 10*6/MM3 (ref 4.14–5.8)
RBC # UR STRIP: NORMAL /HPF
REF LAB TEST METHOD: NORMAL
SODIUM SERPL-SCNC: 138 MMOL/L (ref 136–145)
SP GR UR STRIP: 1.01 (ref 1–1.03)
SQUAMOUS #/AREA URNS HPF: NORMAL /HPF
T4 FREE SERPL-MCNC: 1.38 NG/DL (ref 0.93–1.7)
TRIGL SERPL-MCNC: 306 MG/DL (ref 0–150)
TSH SERPL DL<=0.05 MIU/L-ACNC: 2.32 UIU/ML (ref 0.27–4.2)
UROBILINOGEN UR QL STRIP: NORMAL
VIT B12 BLD-MCNC: 578 PG/ML (ref 211–946)
VLDLC SERPL-MCNC: 57 MG/DL (ref 5–40)
WBC # UR STRIP: NORMAL /HPF
WBC NRBC COR # BLD: 6.8 10*3/MM3 (ref 3.4–10.8)

## 2022-04-14 PROCEDURE — 84439 ASSAY OF FREE THYROXINE: CPT | Performed by: INTERNAL MEDICINE

## 2022-04-14 PROCEDURE — 80050 GENERAL HEALTH PANEL: CPT | Performed by: INTERNAL MEDICINE

## 2022-04-14 PROCEDURE — 80061 LIPID PANEL: CPT | Performed by: INTERNAL MEDICINE

## 2022-04-14 PROCEDURE — 82607 VITAMIN B-12: CPT | Performed by: INTERNAL MEDICINE

## 2022-04-14 PROCEDURE — 81001 URINALYSIS AUTO W/SCOPE: CPT | Performed by: INTERNAL MEDICINE

## 2022-04-14 PROCEDURE — 82306 VITAMIN D 25 HYDROXY: CPT | Performed by: INTERNAL MEDICINE

## 2022-04-14 PROCEDURE — 36415 COLL VENOUS BLD VENIPUNCTURE: CPT | Performed by: INTERNAL MEDICINE

## 2022-04-14 PROCEDURE — 83036 HEMOGLOBIN GLYCOSYLATED A1C: CPT | Performed by: INTERNAL MEDICINE

## 2022-04-19 ENCOUNTER — OFFICE VISIT (OUTPATIENT)
Dept: FAMILY MEDICINE CLINIC | Facility: CLINIC | Age: 37
End: 2022-04-19

## 2022-04-19 VITALS
RESPIRATION RATE: 18 BRPM | DIASTOLIC BLOOD PRESSURE: 82 MMHG | BODY MASS INDEX: 32.64 KG/M2 | SYSTOLIC BLOOD PRESSURE: 134 MMHG | HEART RATE: 78 BPM | WEIGHT: 241 LBS | OXYGEN SATURATION: 98 % | HEIGHT: 72 IN | TEMPERATURE: 96.8 F

## 2022-04-19 DIAGNOSIS — E66.9 OBESITY (BMI 30.0-34.9): ICD-10-CM

## 2022-04-19 DIAGNOSIS — R73.9 HYPERGLYCEMIA: ICD-10-CM

## 2022-04-19 DIAGNOSIS — I10 ESSENTIAL HYPERTENSION: ICD-10-CM

## 2022-04-19 DIAGNOSIS — R20.2 PARESTHESIA OF RIGHT ARM: Primary | ICD-10-CM

## 2022-04-19 DIAGNOSIS — R41.840 LACK OF CONCENTRATION: ICD-10-CM

## 2022-04-19 DIAGNOSIS — E78.00 HYPERCHOLESTEROLEMIA: ICD-10-CM

## 2022-04-19 PROCEDURE — 99395 PREV VISIT EST AGE 18-39: CPT | Performed by: INTERNAL MEDICINE

## 2022-04-19 RX ORDER — LISINOPRIL 5 MG/1
5 TABLET ORAL DAILY
COMMUNITY
Start: 2022-03-06 | End: 2022-05-25

## 2022-04-19 NOTE — PROGRESS NOTES
Subjective   Marcus Flores is a 37 y.o. male. Patient is here today for   Chief Complaint   Patient presents with   • Annual Exam     Physical, pt c/o numbness in right pinky for a couple of days           Vitals:    04/19/22 0815   BP: 134/82   Pulse: 78   Resp: 18   Temp: 96.8 °F (36 °C)   SpO2: 98%     Body mass index is 33 kg/m².    The following portions of the patient's history were reviewed and updated as appropriate: allergies, current medications, past family history, past medical history, past social history, past surgical history and problem list.    Past Medical History:   Diagnosis Date   • Hip pain, left    • MILO on CPAP     AHI 21/h   • Sleep apnea       Allergies   Allergen Reactions   • Amoxicillin Hives      Social History     Socioeconomic History   • Marital status: Unknown   Tobacco Use   • Smoking status: Never Smoker   • Smokeless tobacco: Never Used   Vaping Use   • Vaping Use: Never used   Substance and Sexual Activity   • Alcohol use: Yes     Comment: 3 -4 a wk    • Drug use: Never   • Sexual activity: Defer        Current Outpatient Medications:   •  diclofenac (CATAFLAM) 50 MG tablet, Take 1 tablet by mouth 2 (Two) Times a Day. (Patient taking differently: Take 50 mg by mouth 2 (Two) Times a Day. As needed), Disp: 60 tablet, Rfl: 1  •  lisinopril (PRINIVIL,ZESTRIL) 5 MG tablet, Take 5 mg by mouth Daily., Disp: , Rfl:   •  lisinopril-hydrochlorothiazide (PRINZIDE,ZESTORETIC) 10-12.5 MG per tablet, TAKE 1 TABLET BY MOUTH EVERY DAY, Disp: 30 tablet, Rfl: 3     EKG  ECG Report    Objective   He is here today for a comprehensive physical exam.    When I saw him last in January, I had arranged for him to get a psychological evaluation regarding his concern that he may have ADD.  That has not yet been done.  He has not been contacted yet about any such upcoming appointment.    He complains about tingling and numbness in his right hand specifically in his small finger of the right hand.    He  is up-to-date with COVID vaccines though his electronic medical record suggest that he has not he has had 2 COVID vaccines and a booster I do not know whether it was Moderna or Pfizer.         Marcus Flores 37 y.o. male who presents for an Annual Wellness Visit.  he has a history of   Patient Active Problem List   Diagnosis   • Acute idiopathic gout involving toe of right foot   • Sleep apnea   • Left hip pain   • Well adult exam   • Obesity (BMI 30.0-34.9)   • Hyperglycemia   • Class 1 obesity due to excess calories without serious comorbidity with body mass index (BMI) of 32.0 to 32.9 in adult   • Hypercholesterolemia   • Essential hypertension   • Lack of concentration   • Paresthesia of right arm   .  he has been doing well with new interval problems.  Labs results discussed in detail with the patient.  Plan to update vaccines if needed today.      Lab Results (most recent)     None            Review of Systems   Constitutional: Negative.    HENT: Negative.    Eyes: Negative.    Respiratory: Negative.    Cardiovascular: Negative.    Gastrointestinal: Negative.    Endocrine: Negative.    Genitourinary: Negative.    Musculoskeletal: Negative.    Skin: Negative.    Neurological:        He has some numbness over the last week or 2 and his small finger of his right hand.   Hematological: Negative.    Psychiatric/Behavioral: Negative.        Physical Exam  Vitals and nursing note reviewed.   Constitutional:       General: He is not in acute distress.     Appearance: Normal appearance. He is obese. He is not ill-appearing, toxic-appearing or diaphoretic.      Comments: Pleasant, neatly groomed, in no distress.   HENT:      Head: Normocephalic and atraumatic.      Right Ear: Tympanic membrane, ear canal and external ear normal. There is no impacted cerumen.      Left Ear: Tympanic membrane, ear canal and external ear normal. There is no impacted cerumen.      Nose: Nose normal. No congestion or rhinorrhea.       Mouth/Throat:      Mouth: Mucous membranes are moist.      Pharynx: No oropharyngeal exudate or posterior oropharyngeal erythema.   Eyes:      General: No scleral icterus.        Right eye: No discharge.         Left eye: No discharge.      Extraocular Movements: Extraocular movements intact.      Pupils: Pupils are equal, round, and reactive to light.   Neck:      Vascular: No carotid bruit.   Cardiovascular:      Rate and Rhythm: Normal rate and regular rhythm.      Pulses: Normal pulses.      Heart sounds: Normal heart sounds.   Pulmonary:      Effort: Pulmonary effort is normal. No respiratory distress.      Breath sounds: Normal breath sounds. No stridor. No wheezing, rhonchi or rales.   Chest:      Chest wall: No tenderness.   Abdominal:      General: Abdomen is flat. Bowel sounds are normal. There is no distension.      Palpations: Abdomen is soft. There is no mass.      Tenderness: There is no abdominal tenderness. There is no right CVA tenderness, left CVA tenderness, guarding or rebound.      Hernia: No hernia is present.   Genitourinary:     Penis: Normal.       Testes: Normal.      Prostate: Normal.      Rectum: Normal.   Musculoskeletal:         General: No swelling, tenderness, deformity or signs of injury. Normal range of motion.      Cervical back: Normal range of motion and neck supple. No rigidity or tenderness.      Right lower leg: No edema.      Left lower leg: No edema.   Lymphadenopathy:      Cervical: No cervical adenopathy.   Skin:     General: Skin is warm and dry.      Coloration: Skin is not jaundiced or pale.      Findings: No bruising, erythema, lesion or rash.   Neurological:      General: No focal deficit present.      Mental Status: He is alert. Mental status is at baseline. He is disoriented.      Cranial Nerves: No cranial nerve deficit.      Sensory: No sensory deficit.      Motor: No weakness.      Coordination: Coordination normal.      Gait: Gait normal.      Deep Tendon  Reflexes: Reflexes normal.   Psychiatric:         Mood and Affect: Mood normal.         Behavior: Behavior normal.         Thought Content: Thought content normal.         Judgment: Judgment normal.         ASSESSMENT       Problems Addressed this Visit        Cardiac and Vasculature    Hypercholesterolemia    Essential hypertension    Relevant Medications    lisinopril (PRINIVIL,ZESTRIL) 5 MG tablet       Endocrine and Metabolic    Obesity (BMI 30.0-34.9)    Hyperglycemia       Neuro    Lack of concentration    Relevant Orders    Ambulatory Referral to Neuropsychology       Symptoms and Signs    Paresthesia of right arm - Primary    Relevant Orders    EMG & Nerve Conduction Test    EMG & Nerve Conduction Test      Diagnoses       Codes Comments    Paresthesia of right arm    -  Primary ICD-10-CM: R20.2  ICD-9-CM: 782.0     Essential hypertension     ICD-10-CM: I10  ICD-9-CM: 401.9     Hypercholesterolemia     ICD-10-CM: E78.00  ICD-9-CM: 272.0     Obesity (BMI 30.0-34.9)     ICD-10-CM: E66.9  ICD-9-CM: 278.00     Hyperglycemia     ICD-10-CM: R73.9  ICD-9-CM: 790.29     Lack of concentration     ICD-10-CM: R41.840  ICD-9-CM: 799.51           PLAN  I put in another referral for him to be seen and evaluated by neuropsychiatry regarding his complaint of inattentiveness.    He is right small finger pain is probably secondary to carpal tunnel syndrome.  I have set him up for nerve conduction study in his right upper extremity.    His hypertension is borderline well controlled.  When I checked his blood pressure I got to        He has hyperglycemia.    His primary problem is his obesity.    I have asked him to tend to get 30 minutes of brisk aerobic activity most days of the week (5 days a week).    I have asked him to cut back on his food intake.  6 feet tall 241 pounds.  He be better off to lose about 200 pounds.  More would be better.    He has obstructive sleep apnea is compliant with use of CPAP  mask.            There are no Patient Instructions on file for this visit.    No follow-ups on file.

## 2022-05-25 RX ORDER — LISINOPRIL AND HYDROCHLOROTHIAZIDE 12.5; 1 MG/1; MG/1
TABLET ORAL
Qty: 90 TABLET | Refills: 1 | Status: SHIPPED | OUTPATIENT
Start: 2022-05-25 | End: 2022-09-12

## 2022-05-25 NOTE — TELEPHONE ENCOUNTER
Rx Refill Note  Requested Prescriptions     Pending Prescriptions Disp Refills   • lisinopril-hydrochlorothiazide (PRINZIDE,ZESTORETIC) 10-12.5 MG per tablet [Pharmacy Med Name: LISINOPRIL-HCTZ 10-12.5 MG TAB] 90 tablet 1     Sig: TAKE 1 TABLET BY MOUTH EVERY DAY      Last office visit with prescribing clinician: 4/19/2022      Next office visit with prescribing clinician: 10/26/2022            Bre Johansen MA  05/25/22, 09:58 EDT

## 2022-07-27 ENCOUNTER — TELEPHONE (OUTPATIENT)
Dept: NEUROLOGY | Facility: CLINIC | Age: 37
End: 2022-07-27

## 2022-07-28 ENCOUNTER — HOSPITAL ENCOUNTER (OUTPATIENT)
Dept: INFUSION THERAPY | Facility: HOSPITAL | Age: 37
Discharge: HOME | End: 2022-07-28

## 2022-07-28 DIAGNOSIS — R20.2 PARESTHESIA OF RIGHT ARM: ICD-10-CM

## 2022-08-18 ENCOUNTER — TELEPHONE (OUTPATIENT)
Dept: NEUROLOGY | Facility: CLINIC | Age: 37
End: 2022-08-18

## 2022-08-19 ENCOUNTER — HOSPITAL ENCOUNTER (OUTPATIENT)
Dept: INFUSION THERAPY | Facility: HOSPITAL | Age: 37
Discharge: HOME | End: 2022-08-19
Admitting: PSYCHIATRY & NEUROLOGY

## 2022-08-19 DIAGNOSIS — R20.2 PARESTHESIA OF RIGHT ARM: ICD-10-CM

## 2022-08-19 PROCEDURE — 95909 NRV CNDJ TST 5-6 STUDIES: CPT

## 2022-08-19 PROCEDURE — 95886 MUSC TEST DONE W/N TEST COMP: CPT

## 2022-08-19 PROCEDURE — 95909 NRV CNDJ TST 5-6 STUDIES: CPT | Performed by: PSYCHIATRY & NEUROLOGY

## 2022-08-19 PROCEDURE — 95886 MUSC TEST DONE W/N TEST COMP: CPT | Performed by: PSYCHIATRY & NEUROLOGY

## 2022-08-19 NOTE — PROCEDURES
EMG and Nerve Conduction Studies    I.      Instrument used: Neuromax 1002  II.     Please see data sheets for tabular summary of NCS and details on methods, temperatures and lab standards.   III.    EMG muscles tested for upper extremity studies include the deltoid, biceps, triceps, pronator teres, extensor digitorum communis, first dorsal interosseous and abductor pollicis brevis.    IV.   EMG muscles tested for lower extremity studies include the vastus lateralis, tibialis anterior, peroneus longus, medial gastrocnemius and extensor digitorum brevis.    V.    Additional muscles tested as needed.  Paraspinal muscles tested as needed.   VI.   Please see data sheets for tabular summary of EMG findings.   VII. The complete report includes the data sheets.      Indication: Numbness in digit 5 right hand sometimes digit 4  History: 37-year-old male with numbness in the last 2 digits of the right hand.  No significant wrist or elbow injury or pain.  No symptoms on the left.  No neck pain.      Ht: 182 cm  Wt: 109 kg  HbA1C:   Lab Results   Component Value Date    HGBA1C 5.60 04/14/2022     TSH:   Lab Results   Component Value Date    TSH 2.320 04/14/2022       Technical summary:  Nerve conduction studies were obtained in the right arm with 1 comparison on the left.  Skin temperatures were at least 32 °C measured on the palms.  Needle examination was obtained on selected muscles in the right arm.    Results:  1.  Normal right median antidromic sensory distal latency and amplitude.  2.  Normal right ulnar antidromic sensory distal latency and amplitude.  3.  Normal right radial sensory distal latency and amplitude.  4.  Normal right median motor distal latency with a dip prior to the negative potential from the proximal site with higher amplitude creating an artificially fast conduction velocity.  These findings suggested a Qasim-Umair connection and so a crossover test was done demonstrating it.  5.  Slow right ulnar  motor conduction velocity in the short segment across the elbow at 38.5 m/s with normal velocity below the elbow.  Normal distal latency and amplitudes.  Slow left ulnar motor conduction velocity in the short segment across the elbow at 33.7 m/s with normal velocity below the elbow.  Normal distal latency and amplitudes.  6.  Needle examination of selected muscles in the right arm showed normal insertional activities without fibrillations or positive sharp waves.  There were normal motor units and recruitment patterns.    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  There was no evidence of a right median neuropathy or right cervical radiculopathy by this study.  There was a Qasim-Umair connection demonstrated in the right forearm (median to ulnar connection) which is a normal variant.  There is suspicion of a similar finding on the left.  Study results were discussed with the patient.    Marlo Herring M.D.              Dictated utilizing Dragon dictation.

## 2022-09-12 RX ORDER — LISINOPRIL 5 MG/1
TABLET ORAL
Qty: 90 TABLET | Refills: 3 | Status: SHIPPED | OUTPATIENT
Start: 2022-09-12 | End: 2022-10-26

## 2022-09-12 NOTE — TELEPHONE ENCOUNTER
Rx Refill Note  Requested Prescriptions     Pending Prescriptions Disp Refills   • lisinopril (PRINIVIL,ZESTRIL) 5 MG tablet [Pharmacy Med Name: LISINOPRIL 5 MG TABLET] 90 tablet 3     Sig: TAKE 1 TABLET BY MOUTH EVERY DAY      Last office visit with prescribing clinician: 4/19/2022      Next office visit with prescribing clinician: 10/26/2022            Bre Johansen MA  09/12/22, 13:22 EDT

## 2022-10-17 DIAGNOSIS — E78.00 HYPERCHOLESTEROLEMIA: Primary | ICD-10-CM

## 2022-10-17 DIAGNOSIS — R73.9 HYPERGLYCEMIA: ICD-10-CM

## 2022-10-22 LAB
ALBUMIN SERPL-MCNC: 4.9 G/DL (ref 3.5–5.2)
ALBUMIN/GLOB SERPL: 2.5 G/DL
ALP SERPL-CCNC: 41 U/L (ref 39–117)
ALT SERPL-CCNC: 38 U/L (ref 1–41)
APPEARANCE UR: CLEAR
AST SERPL-CCNC: 25 U/L (ref 1–40)
BACTERIA #/AREA URNS HPF: NORMAL /HPF
BASOPHILS # BLD AUTO: 0.03 10*3/MM3 (ref 0–0.2)
BASOPHILS NFR BLD AUTO: 0.5 % (ref 0–1.5)
BILIRUB SERPL-MCNC: 0.2 MG/DL (ref 0–1.2)
BILIRUB UR QL STRIP: NEGATIVE
BUN SERPL-MCNC: 18 MG/DL (ref 6–20)
BUN/CREAT SERPL: 17.5 (ref 7–25)
CALCIUM SERPL-MCNC: 9.6 MG/DL (ref 8.6–10.5)
CASTS URNS MICRO: NORMAL
CHLORIDE SERPL-SCNC: 105 MMOL/L (ref 98–107)
CHOLEST SERPL-MCNC: 227 MG/DL (ref 0–200)
CO2 SERPL-SCNC: 26.3 MMOL/L (ref 22–29)
COLOR UR: YELLOW
CREAT SERPL-MCNC: 1.03 MG/DL (ref 0.76–1.27)
EGFRCR SERPLBLD CKD-EPI 2021: 94.8 ML/MIN/1.73
EOSINOPHIL # BLD AUTO: 0.35 10*3/MM3 (ref 0–0.4)
EOSINOPHIL NFR BLD AUTO: 6.1 % (ref 0.3–6.2)
EPI CELLS #/AREA URNS HPF: NORMAL /HPF
ERYTHROCYTE [DISTWIDTH] IN BLOOD BY AUTOMATED COUNT: 12.7 % (ref 12.3–15.4)
GLOBULIN SER CALC-MCNC: 2 GM/DL
GLUCOSE SERPL-MCNC: 119 MG/DL (ref 65–99)
GLUCOSE UR QL STRIP: NEGATIVE
HBA1C MFR BLD: 5.4 % (ref 4.8–5.6)
HCT VFR BLD AUTO: 42.4 % (ref 37.5–51)
HDLC SERPL-MCNC: 34 MG/DL (ref 40–60)
HGB BLD-MCNC: 14.5 G/DL (ref 13–17.7)
HGB UR QL STRIP: NEGATIVE
IMM GRANULOCYTES # BLD AUTO: 0.01 10*3/MM3 (ref 0–0.05)
IMM GRANULOCYTES NFR BLD AUTO: 0.2 % (ref 0–0.5)
KETONES UR QL STRIP: NEGATIVE
LDLC SERPL CALC-MCNC: 145 MG/DL (ref 0–100)
LDLC/HDLC SERPL: 4.12 {RATIO}
LEUKOCYTE ESTERASE UR QL STRIP: NEGATIVE
LYMPHOCYTES # BLD AUTO: 2.46 10*3/MM3 (ref 0.7–3.1)
LYMPHOCYTES NFR BLD AUTO: 43.2 % (ref 19.6–45.3)
MCH RBC QN AUTO: 30.5 PG (ref 26.6–33)
MCHC RBC AUTO-ENTMCNC: 34.2 G/DL (ref 31.5–35.7)
MCV RBC AUTO: 89.1 FL (ref 79–97)
MONOCYTES # BLD AUTO: 0.52 10*3/MM3 (ref 0.1–0.9)
MONOCYTES NFR BLD AUTO: 9.1 % (ref 5–12)
NEUTROPHILS # BLD AUTO: 2.33 10*3/MM3 (ref 1.7–7)
NEUTROPHILS NFR BLD AUTO: 40.9 % (ref 42.7–76)
NITRITE UR QL STRIP: NEGATIVE
NRBC BLD AUTO-RTO: 0 /100 WBC (ref 0–0.2)
PH UR STRIP: 6 [PH] (ref 5–8)
PLATELET # BLD AUTO: 237 10*3/MM3 (ref 140–450)
POTASSIUM SERPL-SCNC: 5 MMOL/L (ref 3.5–5.2)
PROT SERPL-MCNC: 6.9 G/DL (ref 6–8.5)
PROT UR QL STRIP: NEGATIVE
RBC # BLD AUTO: 4.76 10*6/MM3 (ref 4.14–5.8)
RBC #/AREA URNS HPF: NORMAL /HPF
SODIUM SERPL-SCNC: 143 MMOL/L (ref 136–145)
SP GR UR STRIP: 1.02 (ref 1–1.03)
TRIGL SERPL-MCNC: 264 MG/DL (ref 0–150)
UROBILINOGEN UR STRIP-MCNC: NORMAL MG/DL
VLDLC SERPL CALC-MCNC: 48 MG/DL (ref 5–40)
WBC # BLD AUTO: 5.7 10*3/MM3 (ref 3.4–10.8)
WBC #/AREA URNS HPF: NORMAL /HPF

## 2022-10-26 ENCOUNTER — OFFICE VISIT (OUTPATIENT)
Dept: FAMILY MEDICINE CLINIC | Facility: CLINIC | Age: 37
End: 2022-10-26

## 2022-10-26 VITALS
HEIGHT: 72 IN | TEMPERATURE: 97.7 F | BODY MASS INDEX: 34.27 KG/M2 | HEART RATE: 77 BPM | OXYGEN SATURATION: 99 % | DIASTOLIC BLOOD PRESSURE: 83 MMHG | SYSTOLIC BLOOD PRESSURE: 137 MMHG | WEIGHT: 253 LBS

## 2022-10-26 DIAGNOSIS — E78.00 HYPERCHOLESTEROLEMIA: ICD-10-CM

## 2022-10-26 DIAGNOSIS — I10 ESSENTIAL HYPERTENSION: Primary | ICD-10-CM

## 2022-10-26 DIAGNOSIS — G56.20 ULNAR NERVE ENTRAPMENT, UNSPECIFIED LATERALITY: ICD-10-CM

## 2022-10-26 DIAGNOSIS — R73.01 IMPAIRED FASTING GLUCOSE: ICD-10-CM

## 2022-10-26 PROCEDURE — 99214 OFFICE O/P EST MOD 30 MIN: CPT | Performed by: STUDENT IN AN ORGANIZED HEALTH CARE EDUCATION/TRAINING PROGRAM

## 2022-10-26 RX ORDER — LISINOPRIL 20 MG/1
20 TABLET ORAL DAILY
Qty: 90 TABLET | Refills: 3 | Status: SHIPPED | OUTPATIENT
Start: 2022-10-26

## 2022-10-26 NOTE — PROGRESS NOTES
"Chief Complaint  Follow-up (6 months )    Subjective        Marcus Flores presents to [facility]  History of Present Illness  For follow-up on the labs and EMG study done last month.  Denies having any new issues today  Review of system is negative for fever, headache, chest pain, shortness of breath, palpitation, nausea, vomiting, any recent change in bladder habits.        Objective   Vital Signs:  /83   Pulse 77   Temp 97.7 °F (36.5 °C) (Temporal)   Ht 182 cm (71.65\")   Wt 115 kg (253 lb)   SpO2 99%   BMI 34.65 kg/m²   Estimated body mass index is 34.65 kg/m² as calculated from the following:    Height as of this encounter: 182 cm (71.65\").    Weight as of this encounter: 115 kg (253 lb).          Physical Exam  HENT:      Head: Normocephalic and atraumatic.      Mouth/Throat:      Mouth: Mucous membranes are moist.      Pharynx: Oropharynx is clear.   Eyes:      Extraocular Movements: Extraocular movements intact.      Conjunctiva/sclera: Conjunctivae normal.      Pupils: Pupils are equal, round, and reactive to light.   Cardiovascular:      Rate and Rhythm: Normal rate and regular rhythm.   Pulmonary:      Effort: Pulmonary effort is normal.      Breath sounds: Normal breath sounds.   Abdominal:      General: Bowel sounds are normal.      Palpations: Abdomen is soft.   Musculoskeletal:         General: Normal range of motion.      Cervical back: Neck supple.   Skin:     General: Skin is warm.      Capillary Refill: Capillary refill takes less than 2 seconds.   Neurological:      General: No focal deficit present.      Mental Status: He is alert and oriented to person, place, and time. Mental status is at baseline.   Psychiatric:         Mood and Affect: Mood normal.        Result Review :    CMP    CMP 1/20/22 4/14/22 10/21/22   Glucose 106 (A) 104 (A) 119 (A)   BUN 20 14 18   Creatinine 1.17 1.04 1.03   eGFR Non  Am 79     eGFR African Am 91     Sodium 139 " 138 143   Potassium 4.4 4.0 5.0   Chloride 102 103 105   Calcium 9.6 9.5 9.6   Total Protein 7.3  6.9   Albumin 4.9 4.90 4.90   Globulin 2.4  2.0   Total Bilirubin 0.3 0.2 0.2   Alkaline Phosphatase 44 39 41   AST (SGOT) 22 24 25   ALT (SGPT) 29 32 38   (A) Abnormal value       Comments are available for some flowsheets but are not being displayed.           CBC    CBC 1/20/22 4/14/22 10/21/22   WBC 6.3 6.80 5.70   RBC 4.93 4.87 4.76   Hemoglobin 15.0 14.8 14.5   Hematocrit 44.1 43.0 42.4   MCV 90 88.3 89.1   MCH 30.4 30.4 30.5   MCHC 34.0 34.4 34.2   RDW 12.7 12.9 12.7   Platelets 248 255 237           Lipid Panel    Lipid Panel 1/20/22 4/14/22 10/21/22   Total Cholesterol  236 (A)    Total Cholesterol 229 (A)  227 (A)   Triglycerides 272 (A) 306 (A) 264 (A)   HDL Cholesterol 33 (A) 30 (A) 34 (A)   VLDL Cholesterol 50 (A) 57 (A) 48 (A)   LDL Cholesterol  146 (A) 149 (A) 145 (A)   LDL/HDL Ratio 4.4 (A) 4.83 4.12   (A) Abnormal value       Comments are available for some flowsheets but are not being displayed.           TSH    TSH 4/14/22   TSH 2.320                     Assessment and Plan   Diagnoses and all orders for this visit:    1. Essential hypertension (Primary)  -     lisinopril (PRINIVIL,ZESTRIL) 20 MG tablet; Take 1 tablet by mouth Daily.  Dispense: 90 tablet; Refill: 3  -     CBC Auto Differential; Future  -     Comprehensive Metabolic Panel; Future  -     Lipid Panel With / Chol / HDL Ratio; Future  -     TSH; Future  -     Hemoglobin A1c; Future    2. Impaired fasting glucose  -     CBC Auto Differential; Future  -     Comprehensive Metabolic Panel; Future  -     Lipid Panel With / Chol / HDL Ratio; Future  -     TSH; Future  -     Hemoglobin A1c; Future    3. Hypercholesterolemia  -     CBC Auto Differential; Future  -     Comprehensive Metabolic Panel; Future  -     Lipid Panel With / Chol / HDL Ratio; Future  -     TSH; Future  -     Hemoglobin A1c; Future    4. Ulnar nerve entrapment, unspecified  laterality         Hyperlipidemia  Total cholesterol 227, triglyceride 264, , lipid profile looks similar to what it was 6 months ago except slight improvement in triglycerides seen.  Advised patient to do dietary modification which include discarding a clear low and less red meat consumption       fasting glucose 119, EGFR 94.8, sodium potassium calcium WNL, AST/ALT/ALP 25/38/41 WNL  CBC WNL, hemoglobin 14.5, WBC 5.70, platelet 237    A1c 5.  4 0, 9 months ago patient was in prediabetic range 5.7, so A1c look improved    Urine analysis is clear    Hypertension,   On the chart it was mentioned that patient is on lisinopril 5 mg p.o. daily, but patient is actually taking 2 and half tablets of 5 mg each every day.  Recheck blood pressure 142/84, increase dose of lisinopril to 20 mg p.o. daily, new prescription sent to the patient pharmacy  Patient need to monitor BP and goal is systolic blood pressure between 100 and 130.  Patient agrees with the plan and showed verbalized understanding    For moderate bilateral ulnar neuropathies at the elbow joint  Recently done EMG studies showed moderate bilateral ulnar neuropathies at the elbow joint.  And patient symptoms consistent with numbness to the little finger bilateral.  For now patient is advised to do conservative management which include preventing prolonged elbow flexion while driving or while sitting or at night.  Patient should use soft foam pad underneath the elbow to prevent nerve compression.  Patient is also informed that surgery is one of the option for severe neuropathies but patient would like to go with conservative approach now and if his symptoms worsen then patient will inform us.    Follow Up   No follow-ups on file.  Patient was given instructions and counseling regarding his condition or for health maintenance advice. Please see specific information pulled into the AVS if appropriate.

## 2023-05-01 ENCOUNTER — OFFICE VISIT (OUTPATIENT)
Dept: FAMILY MEDICINE CLINIC | Facility: CLINIC | Age: 38
End: 2023-05-01
Payer: COMMERCIAL

## 2023-05-01 VITALS
WEIGHT: 251 LBS | OXYGEN SATURATION: 98 % | HEART RATE: 78 BPM | DIASTOLIC BLOOD PRESSURE: 64 MMHG | HEIGHT: 72 IN | BODY MASS INDEX: 34 KG/M2 | SYSTOLIC BLOOD PRESSURE: 120 MMHG

## 2023-05-01 DIAGNOSIS — I10 ESSENTIAL HYPERTENSION: Primary | ICD-10-CM

## 2023-05-01 DIAGNOSIS — E66.9 OBESITY (BMI 30.0-34.9): ICD-10-CM

## 2023-05-01 DIAGNOSIS — E78.00 HYPERCHOLESTEROLEMIA: ICD-10-CM

## 2023-05-12 NOTE — PROGRESS NOTES
Subjective  Answers for HPI/ROS submitted by the patient on 5/1/2023  What is the primary reason for your visit?: Other  Please describe your symptoms.: followup visit for chol, hbp, diabetes, etc, following treatment with lisinopril  Have you had these symptoms before?: Yes  How long have you been having these symptoms?: Greater than 2 weeks  Please list any medications you are currently taking for this condition.: lisinopril  Please describe any probable cause for these symptoms. : jena Flores is a 38 y.o. male. Patient is here today for   Chief Complaint   Patient presents with   • Hyperglycemia   • Hypertension     Baby in 2 months          Vitals:    05/01/23 1525   BP: 120/64   Pulse: 78   SpO2: 98%     Body mass index is 34.04 kg/m².      Past Medical History:   Diagnosis Date   • Hip pain, left    • Hypertension    • Obesity    • MILO on CPAP     AHI 21/h   • Sleep apnea       Allergies   Allergen Reactions   • Amoxicillin Hives      Social History     Socioeconomic History   • Marital status: Unknown   Tobacco Use   • Smoking status: Never   • Smokeless tobacco: Never   Vaping Use   • Vaping Use: Never used   Substance and Sexual Activity   • Alcohol use: Yes     Alcohol/week: 0.0 - 3.0 standard drinks     Comment: 3 -4 a wk    • Drug use: No   • Sexual activity: Yes        Current Outpatient Medications:   •  diclofenac (CATAFLAM) 50 MG tablet, Take 1 tablet by mouth 2 (Two) Times a Day., Disp: 60 tablet, Rfl: 1  •  lisinopril (PRINIVIL,ZESTRIL) 20 MG tablet, Take 1 tablet by mouth Daily., Disp: 90 tablet, Rfl: 3     Objective     History of Present Illness  This patient is here to follow-up on hypertension.  I started him on lisinopril at his last visit.    Also he had labs done to follow-up on his hypercholesterolemia and type 2 diabetes.      Hyperglycemia    Hypertension         Review of Systems   Constitutional: Negative.    HENT: Negative.    Respiratory: Negative.    Cardiovascular:  Negative.    Musculoskeletal: Negative.    Psychiatric/Behavioral: Negative.        Physical Exam  Constitutional:       Appearance: Normal appearance. He is obese.      Comments: Pleasant, neatly groomed, no distress.  BMI 34.   Cardiovascular:      Rate and Rhythm: Regular rhythm.      Heart sounds: Normal heart sounds. No murmur heard.    No gallop.   Pulmonary:      Effort: No respiratory distress.      Breath sounds: Normal breath sounds. No wheezing or rales.   Neurological:      Mental Status: He is alert and oriented to person, place, and time.   Psychiatric:         Mood and Affect: Mood normal.         Behavior: Behavior normal.         Thought Content: Thought content normal.           Problems Addressed this Visit        Cardiac and Vasculature    Hypercholesterolemia    Essential hypertension - Primary       Endocrine and Metabolic    Obesity (BMI 30.0-34.9)   Diagnoses       Codes Comments    Essential hypertension    -  Primary ICD-10-CM: I10  ICD-9-CM: 401.9     Hypercholesterolemia     ICD-10-CM: E78.00  ICD-9-CM: 272.0     Obesity (BMI 30.0-34.9)     ICD-10-CM: E66.9  ICD-9-CM: 278.00             PLAN  His hypertension is well controlled.    He has hypercholesterolemia.  I asked him to take a look at [facility] or patient education website and to use their research to review cholesterol-lowering diet recommendations.    I would like to see an LDL cholesterol of less than 100.    He is obese with a BMI of 34.  He is prediabetic with a fasting glucose of 120 and hemoglobin A1c of 5.8.    I have asked him to do his best to lose weight via regular aerobic activity per American Heart Association guidelines of brisk aerobic activity 30 minutes a day 5 days a week and decrease caloric intake.    I asked him to follow-up in about 5 or 6 months for an annual physical exam.  Fasting labs prior to that visit should include: Screening for vitamin D deficiency, CBC, comprehensive metabolic panel, lipid  profile, hemoglobin A1c and urinalysis.  No follow-ups on file.

## 2023-10-26 ENCOUNTER — TELEPHONE (OUTPATIENT)
Dept: PEDIATRICS | Facility: OTHER | Age: 38
End: 2023-10-26
Payer: COMMERCIAL

## 2023-10-26 NOTE — TELEPHONE ENCOUNTER
HUB ATTEMPTED TO WARM TRANSFER AND WAS UNSUCCESSFUL      Caller: Marcus Flores    Relationship to patient: Self    Best call back number: [phone]     Type of visit: LABS    Requested date: PRIOR TO 11/22/23     If rescheduling, when is the original appointment: 11/03/23    Additional notes:PLEASE ADVISE

## 2023-10-29 DIAGNOSIS — I10 ESSENTIAL HYPERTENSION: ICD-10-CM

## 2023-10-30 RX ORDER — LISINOPRIL 20 MG/1
20 TABLET ORAL DAILY
Qty: 90 TABLET | Refills: 3 | Status: SHIPPED | OUTPATIENT
Start: 2023-10-30

## 2023-11-15 DIAGNOSIS — R73.9 HYPERGLYCEMIA: ICD-10-CM

## 2023-11-15 DIAGNOSIS — I10 ESSENTIAL HYPERTENSION: Primary | ICD-10-CM

## 2023-11-15 DIAGNOSIS — E78.00 HYPERCHOLESTEROLEMIA: ICD-10-CM

## 2023-11-23 LAB
25(OH)D3+25(OH)D2 SERPL-MCNC: 45 NG/ML (ref 30–100)
ALBUMIN SERPL-MCNC: 5.3 G/DL (ref 3.5–5.2)
ALBUMIN/GLOB SERPL: 2.3 G/DL
ALP SERPL-CCNC: 50 U/L (ref 39–117)
ALT SERPL-CCNC: 44 U/L (ref 1–41)
APPEARANCE UR: CLEAR
AST SERPL-CCNC: 28 U/L (ref 1–40)
BACTERIA #/AREA URNS HPF: NORMAL /HPF
BASOPHILS # BLD AUTO: 0.03 10*3/MM3 (ref 0–0.2)
BASOPHILS NFR BLD AUTO: 0.4 % (ref 0–1.5)
BILIRUB SERPL-MCNC: 0.3 MG/DL (ref 0–1.2)
BILIRUB UR QL STRIP: NEGATIVE
BUN SERPL-MCNC: 16 MG/DL (ref 6–20)
BUN/CREAT SERPL: 16.2 (ref 7–25)
CALCIUM SERPL-MCNC: 9.9 MG/DL (ref 8.6–10.5)
CASTS URNS MICRO: NORMAL
CHLORIDE SERPL-SCNC: 102 MMOL/L (ref 98–107)
CHOLEST SERPL-MCNC: 229 MG/DL (ref 0–200)
CHOLEST/HDLC SERPL: 7.63 {RATIO}
CO2 SERPL-SCNC: 26.5 MMOL/L (ref 22–29)
COLOR UR: YELLOW
CREAT SERPL-MCNC: 0.99 MG/DL (ref 0.76–1.27)
EGFRCR SERPLBLD CKD-EPI 2021: 98.8 ML/MIN/1.73
EOSINOPHIL # BLD AUTO: 0.29 10*3/MM3 (ref 0–0.4)
EOSINOPHIL NFR BLD AUTO: 3.9 % (ref 0.3–6.2)
EPI CELLS #/AREA URNS HPF: NORMAL /HPF
ERYTHROCYTE [DISTWIDTH] IN BLOOD BY AUTOMATED COUNT: 12.6 % (ref 12.3–15.4)
GLOBULIN SER CALC-MCNC: 2.3 GM/DL
GLUCOSE SERPL-MCNC: 111 MG/DL (ref 65–99)
GLUCOSE UR QL STRIP: NEGATIVE
HBA1C MFR BLD: 5.6 % (ref 4.8–5.6)
HCT VFR BLD AUTO: 42.6 % (ref 37.5–51)
HDLC SERPL-MCNC: 30 MG/DL (ref 40–60)
HGB BLD-MCNC: 14.7 G/DL (ref 13–17.7)
HGB UR QL STRIP: NEGATIVE
IMM GRANULOCYTES # BLD AUTO: 0.02 10*3/MM3 (ref 0–0.05)
IMM GRANULOCYTES NFR BLD AUTO: 0.3 % (ref 0–0.5)
KETONES UR QL STRIP: NEGATIVE
LDLC SERPL CALC-MCNC: 155 MG/DL (ref 0–100)
LEUKOCYTE ESTERASE UR QL STRIP: NEGATIVE
LYMPHOCYTES # BLD AUTO: 2.67 10*3/MM3 (ref 0.7–3.1)
LYMPHOCYTES NFR BLD AUTO: 35.9 % (ref 19.6–45.3)
MCH RBC QN AUTO: 30.6 PG (ref 26.6–33)
MCHC RBC AUTO-ENTMCNC: 34.5 G/DL (ref 31.5–35.7)
MCV RBC AUTO: 88.8 FL (ref 79–97)
MONOCYTES # BLD AUTO: 0.55 10*3/MM3 (ref 0.1–0.9)
MONOCYTES NFR BLD AUTO: 7.4 % (ref 5–12)
NEUTROPHILS # BLD AUTO: 3.87 10*3/MM3 (ref 1.7–7)
NEUTROPHILS NFR BLD AUTO: 52.1 % (ref 42.7–76)
NITRITE UR QL STRIP: NEGATIVE
NRBC BLD AUTO-RTO: 0 /100 WBC (ref 0–0.2)
PH UR STRIP: 6 [PH] (ref 5–8)
PLATELET # BLD AUTO: 298 10*3/MM3 (ref 140–450)
POTASSIUM SERPL-SCNC: 4.8 MMOL/L (ref 3.5–5.2)
PROT SERPL-MCNC: 7.6 G/DL (ref 6–8.5)
PROT UR QL STRIP: NEGATIVE
RBC # BLD AUTO: 4.8 10*6/MM3 (ref 4.14–5.8)
RBC #/AREA URNS HPF: NORMAL /HPF
SODIUM SERPL-SCNC: 138 MMOL/L (ref 136–145)
SP GR UR STRIP: 1.02 (ref 1–1.03)
TRIGL SERPL-MCNC: 239 MG/DL (ref 0–150)
UROBILINOGEN UR STRIP-MCNC: NORMAL MG/DL
VLDLC SERPL CALC-MCNC: 44 MG/DL (ref 5–40)
WBC # BLD AUTO: 7.43 10*3/MM3 (ref 3.4–10.8)
WBC #/AREA URNS HPF: NORMAL /HPF

## 2023-11-29 ENCOUNTER — OFFICE VISIT (OUTPATIENT)
Dept: FAMILY MEDICINE CLINIC | Facility: CLINIC | Age: 38
End: 2023-11-29
Payer: COMMERCIAL

## 2023-11-29 VITALS
RESPIRATION RATE: 16 BRPM | OXYGEN SATURATION: 96 % | SYSTOLIC BLOOD PRESSURE: 122 MMHG | WEIGHT: 259.5 LBS | BODY MASS INDEX: 35.15 KG/M2 | HEIGHT: 72 IN | DIASTOLIC BLOOD PRESSURE: 74 MMHG | HEART RATE: 74 BPM | TEMPERATURE: 98.1 F

## 2023-11-29 DIAGNOSIS — R73.9 HYPERGLYCEMIA: ICD-10-CM

## 2023-11-29 DIAGNOSIS — I10 ESSENTIAL HYPERTENSION: ICD-10-CM

## 2023-11-29 DIAGNOSIS — G47.33 OBSTRUCTIVE SLEEP APNEA SYNDROME: ICD-10-CM

## 2023-11-29 DIAGNOSIS — E78.00 HYPERCHOLESTEROLEMIA: Primary | ICD-10-CM

## 2023-11-29 DIAGNOSIS — Z00.00 WELL ADULT EXAM: ICD-10-CM

## 2023-11-29 RX ORDER — ROSUVASTATIN CALCIUM 20 MG/1
20 TABLET, COATED ORAL DAILY
Qty: 90 TABLET | Refills: 3 | Status: SHIPPED | OUTPATIENT
Start: 2023-11-29

## 2023-11-29 NOTE — PROGRESS NOTES
Subjective   Marcsu Flores is a 38 y.o. male. Patient is here today for   Chief Complaint   Patient presents with    Annual Exam    Hypertension    Hyperlipidemia     PHYSICAL HAD LABS A COUPLE WEEKS AGO           Vitals:    11/29/23 1548   BP: 122/74   Pulse: 74   Resp: 16   Temp: 98.1 °F (36.7 °C)   SpO2: 96%     Body mass index is 35.19 kg/m².    The following portions of the patient's history were reviewed and updated as appropriate: allergies, current medications, past family history, past medical history, past social history, past surgical history and problem list.    Past Medical History:   Diagnosis Date    Hip pain, left     Hypertension     Obesity     MILO on CPAP     AHI 21/h    Sleep apnea       Allergies   Allergen Reactions    Amoxicillin Hives      Social History     Socioeconomic History    Marital status:    Tobacco Use    Smoking status: Never     Passive exposure: Never    Smokeless tobacco: Never   Vaping Use    Vaping Use: Never used   Substance and Sexual Activity    Alcohol use: Yes     Alcohol/week: 0.0 - 3.0 standard drinks of alcohol     Comment: 3 -4 a wk     Drug use: No    Sexual activity: Yes        Current Outpatient Medications:     diclofenac (CATAFLAM) 50 MG tablet, Take 1 tablet by mouth 2 (Two) Times a Day., Disp: 60 tablet, Rfl: 1    lisinopril (PRINIVIL,ZESTRIL) 20 MG tablet, TAKE 1 TABLET BY MOUTH EVERY DAY, Disp: 90 tablet, Rfl: 3    rosuvastatin (Crestor) 20 MG tablet, Take 1 tablet by mouth Daily., Disp: 90 tablet, Rfl: 3     EKG Procedures    ECG Report    Objective   History of Present Illness  This 38-year-old patient is here for an annual physical exam.    He became a father in July.  He has a son now who is 4 months old.    He feels well.    He tells me that his brother has developed type 2 diabetes.    He himself has obstructive sleep apnea and is compliant with use of CPAP.  Hypertension    Hyperlipidemia       Marcus Flores 38 y.o. male who presents for an  Annual physical exam.   Labs results discussed in detail with the patient.  Plan to update vaccines if needed today.    Health Habits:  Dental Exam. up to date  Eye Exam. up to date  Exercise: 0 times/week.  Current exercise activities include: none    Preventative counseling  Discussed face to face the importance of healthy diet and exercise.     Lab Results (most recent)       None              Review of Systems   Constitutional: Negative.    HENT: Negative.     Eyes: Negative.    Respiratory: Negative.     Cardiovascular: Negative.    Gastrointestinal: Negative.    Endocrine: Negative.    Genitourinary: Negative.    Musculoskeletal: Negative.    Allergic/Immunologic: Negative.    Neurological: Negative.    Hematological: Negative.    Psychiatric/Behavioral: Negative.         Physical Exam  Vitals and nursing note reviewed.   Constitutional:       General: He is not in acute distress.     Appearance: Normal appearance. He is obese. He is not ill-appearing, toxic-appearing or diaphoretic.      Comments: Pleasant, neatly groomed, no distress.  BMI 35.   HENT:      Head: Normocephalic and atraumatic.      Right Ear: Tympanic membrane, ear canal and external ear normal.      Left Ear: Tympanic membrane, ear canal and external ear normal.      Nose: Nose normal.      Mouth/Throat:      Mouth: Mucous membranes are moist.      Pharynx: No oropharyngeal exudate.   Eyes:      General:         Right eye: No discharge.         Left eye: No discharge.      Extraocular Movements: Extraocular movements intact.      Conjunctiva/sclera: Conjunctivae normal.   Neck:      Vascular: No carotid bruit.   Cardiovascular:      Rate and Rhythm: Normal rate and regular rhythm.      Heart sounds: Normal heart sounds. No murmur heard.     No gallop.   Pulmonary:      Effort: No respiratory distress.      Breath sounds: Normal breath sounds. No wheezing or rales.   Abdominal:      General: Abdomen is flat. There is no distension.       Palpations: Abdomen is soft. There is no mass.      Tenderness: There is no abdominal tenderness. There is no guarding or rebound.      Hernia: No hernia is present.   Genitourinary:     Penis: Normal.       Testes: Normal.      Prostate: Normal.      Rectum: Normal.   Musculoskeletal:         General: No swelling, tenderness, deformity or signs of injury.      Cervical back: Normal range of motion and neck supple. No rigidity or tenderness.      Right lower leg: No edema.      Left lower leg: No edema.   Lymphadenopathy:      Cervical: No cervical adenopathy.   Skin:     General: Skin is warm and dry.      Coloration: Skin is not jaundiced.      Findings: No bruising.   Neurological:      General: No focal deficit present.      Mental Status: He is alert and oriented to person, place, and time.      Cranial Nerves: No cranial nerve deficit.      Motor: No weakness.   Psychiatric:         Mood and Affect: Mood normal.         Behavior: Behavior normal.         Thought Content: Thought content normal.         ASSESSMENT       Problems Addressed this Visit          Cardiac and Vasculature    Hypercholesterolemia - Primary    Relevant Medications    rosuvastatin (Crestor) 20 MG tablet    Essential hypertension       Endocrine and Metabolic    Hyperglycemia       Health Encounters    Well adult exam       Sleep    Sleep apnea     Diagnoses         Codes Comments    Hypercholesterolemia    -  Primary ICD-10-CM: E78.00  ICD-9-CM: 272.0     Essential hypertension     ICD-10-CM: I10  ICD-9-CM: 401.9     Hyperglycemia     ICD-10-CM: R73.9  ICD-9-CM: 790.29     Well adult exam     ICD-10-CM: Z00.00  ICD-9-CM: V70.0     Obstructive sleep apnea syndrome     ICD-10-CM: G47.33  ICD-9-CM: 327.23             PLAN  His hypertension is well-controlled.    He has obstructive sleep apnea and is compliant with use of CPAP.    He has hyperglycemia and a normal hemoglobin A1c.  He is prediabetic.  He is obese with a BMI of 35.  Of asked  him to do his best to lose weight via regular aerobic activity per American Heart Association guidelines and decrease caloric intake.    He has hypercholesterolemia.  Over the years he is done what he could do to modify his cholesterol but he has not had any success.  I asked him to start rosuvastatin 20 mg daily.    I will have him back in about 3 months.  Fasting labs prior to that visit should include: Lipid profile and comprehensive metabolic panel.    I would like him to shave his chest shortly before his next visit to get an electrocardiogram and him.  It would be nice to have 1 just because he is 38 years old.  There are no Patient Instructions on file for this visit.    No follow-ups on file.  Patient was given instructions and counseling regarding his  condition for health maintenance advice.  Please see specific information pulled into the AVS if appropriate.

## 2024-03-06 DIAGNOSIS — I10 ESSENTIAL HYPERTENSION: ICD-10-CM

## 2024-03-06 DIAGNOSIS — E78.00 HYPERCHOLESTEROLEMIA: Primary | ICD-10-CM

## 2024-03-23 LAB
ALBUMIN SERPL-MCNC: 4.9 G/DL (ref 3.5–5.2)
ALBUMIN/GLOB SERPL: 2 G/DL
ALP SERPL-CCNC: 48 U/L (ref 39–117)
ALT SERPL-CCNC: 53 U/L (ref 1–41)
AST SERPL-CCNC: 29 U/L (ref 1–40)
BILIRUB SERPL-MCNC: 0.3 MG/DL (ref 0–1.2)
BUN SERPL-MCNC: 14 MG/DL (ref 6–20)
BUN/CREAT SERPL: 14.7 (ref 7–25)
CALCIUM SERPL-MCNC: 9.6 MG/DL (ref 8.6–10.5)
CHLORIDE SERPL-SCNC: 104 MMOL/L (ref 98–107)
CHOLEST SERPL-MCNC: 141 MG/DL (ref 0–200)
CHOLEST/HDLC SERPL: 3.71 {RATIO}
CO2 SERPL-SCNC: 26.7 MMOL/L (ref 22–29)
CREAT SERPL-MCNC: 0.95 MG/DL (ref 0.76–1.27)
EGFRCR SERPLBLD CKD-EPI 2021: 103.8 ML/MIN/1.73
GLOBULIN SER CALC-MCNC: 2.4 GM/DL
GLUCOSE SERPL-MCNC: 102 MG/DL (ref 65–99)
HDLC SERPL-MCNC: 38 MG/DL (ref 40–60)
LDLC SERPL CALC-MCNC: 68 MG/DL (ref 0–100)
POTASSIUM SERPL-SCNC: 4.4 MMOL/L (ref 3.5–5.2)
PROT SERPL-MCNC: 7.3 G/DL (ref 6–8.5)
SODIUM SERPL-SCNC: 139 MMOL/L (ref 136–145)
TRIGL SERPL-MCNC: 213 MG/DL (ref 0–150)
VLDLC SERPL CALC-MCNC: 35 MG/DL (ref 5–40)

## 2024-03-27 ENCOUNTER — OFFICE VISIT (OUTPATIENT)
Dept: FAMILY MEDICINE CLINIC | Facility: CLINIC | Age: 39
End: 2024-03-27
Payer: COMMERCIAL

## 2024-03-27 VITALS
DIASTOLIC BLOOD PRESSURE: 82 MMHG | TEMPERATURE: 97.1 F | WEIGHT: 261.5 LBS | SYSTOLIC BLOOD PRESSURE: 128 MMHG | HEART RATE: 77 BPM | HEIGHT: 72 IN | BODY MASS INDEX: 35.42 KG/M2 | RESPIRATION RATE: 16 BRPM | OXYGEN SATURATION: 98 %

## 2024-03-27 DIAGNOSIS — E78.00 HYPERCHOLESTEROLEMIA: ICD-10-CM

## 2024-03-27 DIAGNOSIS — Z92.89 HISTORY OF EKG: Primary | ICD-10-CM

## 2024-03-27 DIAGNOSIS — I10 ESSENTIAL HYPERTENSION: ICD-10-CM

## 2024-03-29 NOTE — PROGRESS NOTES
Subjective   Marcus Flores is a 38 y.o. male. Patient is here today for   Chief Complaint   Patient presents with    Med Management          Vitals:    03/27/24 1531   BP: 128/82   Pulse: 77   Resp: 16   Temp: 97.1 °F (36.2 °C)   SpO2: 98%     Body mass index is 35.46 kg/m².      Past Medical History:   Diagnosis Date    Hip pain, left     Hypertension     Obesity     MILO on CPAP     AHI 21/h    Sleep apnea       Allergies   Allergen Reactions    Amoxicillin Hives      Social History     Socioeconomic History    Marital status:    Tobacco Use    Smoking status: Never     Passive exposure: Never    Smokeless tobacco: Never   Vaping Use    Vaping status: Never Used   Substance and Sexual Activity    Alcohol use: Yes     Alcohol/week: 0.0 - 3.0 standard drinks of alcohol     Comment: 3 -4 a wk     Drug use: No    Sexual activity: Yes        Current Outpatient Medications:     diclofenac (CATAFLAM) 50 MG tablet, Take 1 tablet by mouth 2 (Two) Times a Day., Disp: 60 tablet, Rfl: 1    lisinopril (PRINIVIL,ZESTRIL) 20 MG tablet, TAKE 1 TABLET BY MOUTH EVERY DAY, Disp: 90 tablet, Rfl: 3    rosuvastatin (Crestor) 20 MG tablet, Take 1 tablet by mouth Daily., Disp: 90 tablet, Rfl: 3     Objective     History of Present Illness  He is here to follow-up on hypertension.    He notes infrequent slight chest pressure without radiation, nausea, vomiting, diaphoresis.  It last for few moments and then fades away.       Review of Systems   Constitutional: Negative.    HENT: Negative.     Respiratory: Negative.     Cardiovascular: Negative.    Musculoskeletal: Negative.    Psychiatric/Behavioral: Negative.         Physical Exam  Vitals and nursing note reviewed.   Constitutional:       Appearance: Normal appearance. He is obese.   Neck:      Vascular: No carotid bruit.   Cardiovascular:      Rate and Rhythm: Regular rhythm.      Heart sounds: Normal heart sounds. No murmur heard.     No gallop.   Pulmonary:      Effort: No  respiratory distress.      Breath sounds: Normal breath sounds. No wheezing or rales.   Neurological:      Mental Status: He is alert and oriented to person, place, and time.   Psychiatric:         Mood and Affect: Mood normal.         Behavior: Behavior normal.         Thought Content: Thought content normal.     Electrocardiogram done today shows normal sinus rhythm with a regular rate.  There were no ST or T wave changes indicative of ischemia.  This is a normal EKG.      Problems Addressed this Visit          Cardiac and Vasculature    Hypercholesterolemia    Essential hypertension     Other Visit Diagnoses       History of EKG    -  Primary    Relevant Orders    ECG 12 Lead (Completed)          Diagnoses         Codes Comments    History of EKG    -  Primary ICD-10-CM: Z92.89  ICD-9-CM: V15.89     Essential hypertension     ICD-10-CM: I10  ICD-9-CM: 401.9     Hypercholesterolemia     ICD-10-CM: E78.00  ICD-9-CM: 272.0               PLAN  His electrocardiogram was benign today.    His hypertension is well-controlled.    His hypercholesterolemia is very well-controlled on rosuvastatin 20 mg daily.    I asked him to follow-up in about 6 months.  No follow-ups on file.

## 2024-10-07 ENCOUNTER — OFFICE VISIT (OUTPATIENT)
Dept: FAMILY MEDICINE CLINIC | Facility: CLINIC | Age: 39
End: 2024-10-07
Payer: COMMERCIAL

## 2024-10-07 VITALS
DIASTOLIC BLOOD PRESSURE: 88 MMHG | HEIGHT: 72 IN | OXYGEN SATURATION: 98 % | TEMPERATURE: 97.1 F | WEIGHT: 264.3 LBS | RESPIRATION RATE: 16 BRPM | SYSTOLIC BLOOD PRESSURE: 122 MMHG | HEART RATE: 70 BPM | BODY MASS INDEX: 35.8 KG/M2

## 2024-10-07 DIAGNOSIS — E66.811 OBESITY (BMI 30.0-34.9): ICD-10-CM

## 2024-10-07 DIAGNOSIS — E78.00 HYPERCHOLESTEROLEMIA: ICD-10-CM

## 2024-10-07 DIAGNOSIS — I10 ESSENTIAL HYPERTENSION: Primary | ICD-10-CM

## 2024-10-07 PROCEDURE — 99213 OFFICE O/P EST LOW 20 MIN: CPT | Performed by: INTERNAL MEDICINE

## 2024-10-08 NOTE — PROGRESS NOTES
Subjective   Marcus Flores is a 39 y.o. male. Patient is here today for   Chief Complaint   Patient presents with    Hypertension        Hypertension      History of Present Illness  The patient is a 39-year-old male who presents for evaluation of multiple medical concerns.    He has undergone lab work and reports feeling well overall. He is currently on medication for cholesterol management, which he started six months ago. Since then, he has made dietary changes, reducing his intake of red meat and increasing consumption of fish and vegetarian meals.    He began taking semaglutide a week ago, with the second dose administered this morning. He is unsure of its effectiveness at this point. An anti-nausea medication was included in his treatment plan. At the third week, the dosage is expected to increase to 20 units, followed by a reevaluation in the fifth week to determine the continuation of the medication.    His healthcare providers have expressed concern about his thyroid function, although he is not aware of any existing thyroid issues.    He acknowledges being overweight and has struggled with weight management throughout his life, having tried various solutions.    SOCIAL HISTORY  He does not smoke.      Vitals:    10/07/24 1534   BP: 122/88   Pulse: 70   Resp: 16   Temp: 97.1 °F (36.2 °C)   SpO2: 98%     Body mass index is 35.84 kg/m².    Past Medical History:   Diagnosis Date    Hip pain, left     Hypertension     Obesity     MILO on CPAP     AHI 21/h    Sleep apnea       Allergies   Allergen Reactions    Amoxicillin Hives     Says he was prescribed when he had mumps and med caused reaction;requesting removal.    Pt informed to discuss with PCP       Social History     Socioeconomic History    Marital status:    Tobacco Use    Smoking status: Never     Passive exposure: Never    Smokeless tobacco: Never   Vaping Use    Vaping status: Never Used   Substance and Sexual Activity    Alcohol use: Yes      Alcohol/week: 0.0 - 3.0 standard drinks of alcohol     Comment: 3 -4 a wk     Drug use: No    Sexual activity: Yes        Current Outpatient Medications:     diclofenac (CATAFLAM) 50 MG tablet, Take 1 tablet by mouth 2 (Two) Times a Day., Disp: 60 tablet, Rfl: 1    lisinopril (PRINIVIL,ZESTRIL) 20 MG tablet, TAKE 1 TABLET BY MOUTH EVERY DAY, Disp: 90 tablet, Rfl: 3    rosuvastatin (Crestor) 20 MG tablet, Take 1 tablet by mouth Daily., Disp: 90 tablet, Rfl: 3    SEMAGLUTIDE,0.25 OR 0.5MG/DOS, SC, Inject  under the skin into the appropriate area as directed. 0.25 weekly, Disp: , Rfl:      Objective     Review of Systems   Constitutional: Negative.    HENT: Negative.     Respiratory: Negative.         Physical Exam  Vitals and nursing note reviewed.   Constitutional:       General: He is not in acute distress.     Appearance: Normal appearance. He is obese. He is not ill-appearing, toxic-appearing or diaphoretic.      Comments: Pleasant, neatly groomed, in no distress.  BMI 35.8.   Neck:      Vascular: No carotid bruit.   Cardiovascular:      Rate and Rhythm: Regular rhythm.      Heart sounds: Normal heart sounds. No murmur heard.     No gallop.   Pulmonary:      Effort: No respiratory distress.      Breath sounds: Normal breath sounds. No wheezing or rales.   Neurological:      Mental Status: He is alert and oriented to person, place, and time.   Psychiatric:         Mood and Affect: Mood normal.         Behavior: Behavior normal.         Thought Content: Thought content normal.       Physical Exam      Results  Laboratory Studies  Total cholesterol is 121. HDL cholesterol is 31. LDL cholesterol is 58. ALT is slightly elevated.    Assessment & Plan    Problems Addressed this Visit          Cardiac and Vasculature    Hypercholesterolemia    Essential hypertension - Primary       Endocrine and Metabolic    Obesity (BMI 30.0-34.9)     Diagnoses         Codes Comments    Essential hypertension    -  Primary ICD-10-CM:  I10  ICD-9-CM: 401.9     Hypercholesterolemia     ICD-10-CM: E78.00  ICD-9-CM: 272.0     Obesity (BMI 30.0-34.9)     ICD-10-CM: E66.811  ICD-9-CM: 278.00           Assessment & Plan  1. Hyperlipidemia.  His total cholesterol level is 121, with an LDL cholesterol level of 58, which is within the acceptable range of less than 70. The improvement in his cholesterol levels can be attributed to the prescribed medication. His HDL cholesterol is 31, which has decreased from 38, but the LDL/HDL ratio has improved. There is a slight elevation in his ALT, which could be due to fatty liver disease. The cholesterol medication may also contribute to higher AST and ALT levels, but these were already slightly elevated prior to the initiation of the medication. He is advised to continue the current cholesterol medication and maintain a diet with less red meat, focusing on fish and vegetarian meals.    2. Overweight.  He reports being heavier than ever before and has started semaglutide from an online clinic. He took the second dose this morning and will double the dose to 20 units in the third week. The initial smaller dose is not therapeutic but helps to acclimate to larger doses. Potential side effects include GI tract upset, nausea, and constipation. An anti-nausea medication has been included. He will be reevaluated after the fifth week to determine the continuation of the treatment. He is advised to monitor for any adverse effects and follow up with the clinic as needed.    3. Health Maintenance.  He is scheduled to receive both the COVID-19 and influenza vaccines on Wednesday at [facility].          No follow-ups on file.    Patient or patient representative verbalized consent for the use of Ambient Listening during the visit with  Owen Arellano MD for chart documentation. 10/8/2024  15:30 EDT

## 2024-12-04 DIAGNOSIS — I10 ESSENTIAL HYPERTENSION: ICD-10-CM

## 2024-12-04 RX ORDER — LISINOPRIL 20 MG/1
20 TABLET ORAL DAILY
Qty: 90 TABLET | Refills: 3 | Status: SHIPPED | OUTPATIENT
Start: 2024-12-04

## 2024-12-04 RX ORDER — ROSUVASTATIN CALCIUM 20 MG/1
20 TABLET, COATED ORAL DAILY
Qty: 90 TABLET | Refills: 0 | Status: SHIPPED | OUTPATIENT
Start: 2024-12-04

## 2024-12-19 ENCOUNTER — OFFICE VISIT (OUTPATIENT)
Dept: FAMILY MEDICINE CLINIC | Facility: CLINIC | Age: 39
End: 2024-12-19
Payer: COMMERCIAL

## 2024-12-19 VITALS
OXYGEN SATURATION: 98 % | TEMPERATURE: 96.2 F | DIASTOLIC BLOOD PRESSURE: 82 MMHG | WEIGHT: 267.4 LBS | RESPIRATION RATE: 16 BRPM | HEIGHT: 72 IN | HEART RATE: 89 BPM | SYSTOLIC BLOOD PRESSURE: 132 MMHG | BODY MASS INDEX: 36.22 KG/M2

## 2024-12-19 DIAGNOSIS — H61.22 HEARING LOSS DUE TO CERUMEN IMPACTION, LEFT: Primary | ICD-10-CM

## 2024-12-19 PROCEDURE — 99213 OFFICE O/P EST LOW 20 MIN: CPT | Performed by: STUDENT IN AN ORGANIZED HEALTH CARE EDUCATION/TRAINING PROGRAM

## 2024-12-19 PROCEDURE — 69209 REMOVE IMPACTED EAR WAX UNI: CPT | Performed by: STUDENT IN AN ORGANIZED HEALTH CARE EDUCATION/TRAINING PROGRAM

## 2024-12-19 NOTE — PROGRESS NOTES
Office Note     Name: Marcus Flores    : 1985     MRN: [id number]     Chief Complaint  Earache    Subjective     History of Present Illness:  Marcus Flores is a 39 y.o. male     History of Present Illness  The patient is a 39-year-old male who presents today with complaints of feeling like his ear is clogged.    He reports a sensation of intermittent ear clogging, which he first noticed approximately one week ago. The condition fluctuates, with periods of clarity upon waking, but subsequent episodes of muffled hearing following nasal expulsion. He has attempted self-treatment with over-the-counter products such as Debrox and small plastic scrapers, which have resulted in some wax removal but have not alleviated the overall sensation of blockage. He reports no recent nasal congestion or sinus issues, although he recalls a potential episode within the past 3 weeks.    MEDICATIONS  Debrox       Past Medical History:   Past Medical History:   Diagnosis Date    Hip pain, left     Hypertension     Obesity     MILO on CPAP     AHI 21/h    Sleep apnea        Past Surgical History:   Past Surgical History:   Procedure Laterality Date    EYE SURGERY         Immunizations:   Immunization History   Administered Date(s) Administered    COVID-19 (MODERNA) 1st,2nd,3rd Dose Monovalent 2021, 2021, 2021    COVID-19 (PFIZER) BIVALENT 12+YRS 2023    COVID-19 (PFIZER) BIVALENT 5-11YRS 2023    Flublok 18+yrs 2021    Influenza Injectable Mdck Pf Quad 2017    Influenza, Unspecified 10/03/2019    Tdap 2017, 2023        Medications:     Current Outpatient Medications:     diclofenac (CATAFLAM) 50 MG tablet, Take 1 tablet by mouth 2 (Two) Times a Day., Disp: 60 tablet, Rfl: 1    lisinopril (PRINIVIL,ZESTRIL) 20 MG tablet, TAKE 1 TABLET BY MOUTH DAILY, Disp: 90 tablet, Rfl: 3    rosuvastatin (CRESTOR) 20 MG tablet, TAKE 1 TABLET BY MOUTH DAILY, Disp: 90 tablet, Rfl: 0     "SEMAGLUTIDE,0.25 OR 0.5MG/DOS, SC, Inject  under the skin into the appropriate area as directed. 0.25 weekly, Disp: , Rfl:     Allergies:   Allergies   Allergen Reactions    Amoxicillin Hives     Says he was prescribed when he had mumps and med caused reaction;requesting removal.    Pt informed to discuss with PCP        Family History:   Family History   Problem Relation Age of Onset    Heart disease Mother     Hypertension Mother     Cancer Father     Glaucoma Father        Social History:   Social History     Socioeconomic History    Marital status:    Tobacco Use    Smoking status: Never     Passive exposure: Never    Smokeless tobacco: Never   Vaping Use    Vaping status: Never Used   Substance and Sexual Activity    Alcohol use: Yes     Alcohol/week: 0.0 - 3.0 standard drinks of alcohol     Comment: 3 -4 a wk     Drug use: No    Sexual activity: Yes         Objective     Vital Signs  /82 (BP Location: Left arm, Patient Position: Sitting, Cuff Size: Adult)   Pulse 89   Temp 96.2 °F (35.7 °C) (Temporal)   Resp 16   Ht 182.9 cm (72.01\")   Wt 121 kg (267 lb 6.4 oz)   SpO2 98%   BMI 36.26 kg/m²   Estimated body mass index is 36.26 kg/m² as calculated from the following:    Height as of this encounter: 182.9 cm (72.01\").    Weight as of this encounter: 121 kg (267 lb 6.4 oz).            Physical Exam  Constitutional:       General: He is not in acute distress.     Appearance: Normal appearance.   HENT:      Right Ear: Tympanic membrane normal.      Left Ear: There is impacted cerumen.   Neurological:      Mental Status: He is oriented to person, place, and time.   Psychiatric:         Mood and Affect: Mood normal.         Behavior: Behavior normal.         Thought Content: Thought content normal.         Judgment: Judgment normal.          Assessment and Plan     Assessment & Plan  1. Cerumen impaction.  He reports a sensation of his ear being clogged, which has been intermittent over the past " week. He has used over-the-counter treatments like Debrox and ear scrapers without significant relief. Examination revealed some darkness in the ear, indicating possible cerumen buildup.  Left ear irrigation was performed for cerumen removal with a significant amount of cerumen removed.  The left ear was revisualized following procedure and the external canal and TM appeared normal.     Ear Cerumen Removal    Date/Time: 12/19/2024 1:49 PM    Performed by: Asif Desir MD  Authorized by: Asif Desir MD  Consent: Verbal consent obtained. Written consent not obtained.  Risks and benefits: risks, benefits and alternatives were discussed  Consent given by: patient  Patient understanding: patient states understanding of the procedure being performed  Patient consent: the patient's understanding of the procedure matches consent given  Procedure consent: procedure consent matches procedure scheduled  Patient identity confirmed: verbally with patient    Anesthesia:  Local Anesthetic: none  Location details: left ear  Patient tolerance: patient tolerated the procedure well with no immediate complications  Procedure type: irrigation   Sedation:  Patient sedated: no              Follow Up  No follow-ups on file.      I spent 20 minutes caring for Marcus on this date of service. This time includes time spent by me in the following activities:preparing for the visit, obtaining and/or reviewing a separately obtained history, performing a medically appropriate examination and/or evaluation , ordering medications, tests, or procedures, and documenting information in the medical record      Asif Desir MD   [facility]  [address] [place] [id number] [phone]    Patient or patient representative verbalized consent for the use of Ambient Listening during the visit with  Asif Desir MD for chart documentation.  12/19/2024  13:52 EST

## 2025-03-03 RX ORDER — ROSUVASTATIN CALCIUM 20 MG/1
20 TABLET, COATED ORAL DAILY
Qty: 90 TABLET | Refills: 0 | Status: SHIPPED | OUTPATIENT
Start: 2025-03-03

## 2025-04-23 ENCOUNTER — TELEPHONE (OUTPATIENT)
Dept: FAMILY MEDICINE CLINIC | Facility: CLINIC | Age: 40
End: 2025-04-23
Payer: COMMERCIAL

## 2025-04-23 NOTE — TELEPHONE ENCOUNTER
HUB TO RELAY    Lvm for pt to r/s appointment on Monday 4/28. Pcp out of office. Hub ok to schedule.   19.48

## 2025-05-05 ENCOUNTER — OFFICE VISIT (OUTPATIENT)
Dept: FAMILY MEDICINE CLINIC | Facility: CLINIC | Age: 40
End: 2025-05-05
Payer: COMMERCIAL

## 2025-05-05 VITALS
HEIGHT: 72 IN | DIASTOLIC BLOOD PRESSURE: 74 MMHG | OXYGEN SATURATION: 97 % | HEART RATE: 73 BPM | SYSTOLIC BLOOD PRESSURE: 120 MMHG | BODY MASS INDEX: 35.49 KG/M2 | WEIGHT: 262 LBS

## 2025-05-05 DIAGNOSIS — I10 ESSENTIAL HYPERTENSION: ICD-10-CM

## 2025-05-05 DIAGNOSIS — E66.811 OBESITY (BMI 30.0-34.9): ICD-10-CM

## 2025-05-05 DIAGNOSIS — R73.9 HYPERGLYCEMIA: ICD-10-CM

## 2025-05-05 DIAGNOSIS — E78.00 HYPERCHOLESTEROLEMIA: Primary | ICD-10-CM

## 2025-05-05 PROCEDURE — 99213 OFFICE O/P EST LOW 20 MIN: CPT | Performed by: INTERNAL MEDICINE

## 2025-05-06 NOTE — PROGRESS NOTES
Subjective   Marcus Flores is a 40 y.o. male. Patient is here today for   Chief Complaint   Patient presents with    Follow-up        History of Present Illness  History of Present Illness  The patient presents for evaluation of hyperlipidemia, elevated blood glucose, and weight management.    He has been adhering to a diet with reduced meat intake and has initiated treatment with rosuvastatin. He reports a low frequency of alcohol consumption, averaging only a few drinks per week. Recent laboratory results indicated that his good cholesterol (HDL) is slightly low, while his bad cholesterol (LDL) has significantly improved, with a target of less than 100. His alkaline phosphatase is slightly elevated, possibly due to the statin medication.    Laboratory results also showed an elevation in blood glucose levels, obtained approximately one week after his return from [facility]. However, his hemoglobin A1c is lower than previous measurements, indicating overall normal average blood sugar levels.    He has been actively engaged in weight loss efforts, achieving a milestone of weighing less than 250 pounds for the first time in a decade. However, he subsequently regained some weight, reaching 253 pounds. He has previously tried compounded semaglutide for weight management, which he procured through [facility] at a discounted rate for the initial 90 days. He was able to repeat this process four times but was unable to increase the dosage beyond the starter dose. He discontinued the use of semaglutide following the news of the elimination of the compounding exception. He reports no adverse effects from the medication, having taken it at a dose of 0.5 mg for approximately 2.5 to 3 months.    SOCIAL HISTORY  He drinks alcohol , maybe a couple of drinks a week.      Vitals:    05/05/25 1535   BP: 120/74   Pulse: 73   SpO2: 97%     Body mass index is 35.53 kg/m².    Past Medical History:   Diagnosis Date    Hip pain, left      Hypertension     Obesity     MILO on CPAP     AHI 21/h    Sleep apnea       Allergies   Allergen Reactions    Amoxicillin Hives     Says he was prescribed when he had mumps and med caused reaction;requesting removal.    Pt informed to discuss with PCP       Social History     Socioeconomic History    Marital status:    Tobacco Use    Smoking status: Never     Passive exposure: Never    Smokeless tobacco: Never   Vaping Use    Vaping status: Never Used   Substance and Sexual Activity    Alcohol use: Yes     Alcohol/week: 0.0 - 3.0 standard drinks of alcohol     Comment: 3 -4 a wk     Drug use: No    Sexual activity: Yes        Current Outpatient Medications:     diclofenac (CATAFLAM) 50 MG tablet, Take 1 tablet by mouth 2 (Two) Times a Day., Disp: 60 tablet, Rfl: 1    lisinopril (PRINIVIL,ZESTRIL) 20 MG tablet, TAKE 1 TABLET BY MOUTH DAILY, Disp: 90 tablet, Rfl: 3    rosuvastatin (CRESTOR) 20 MG tablet, TAKE 1 TABLET BY MOUTH DAILY, Disp: 90 tablet, Rfl: 0    SEMAGLUTIDE,0.25 OR 0.5MG/DOS, SC, Inject  under the skin into the appropriate area as directed. 0.25 weekly (Patient not taking: Reported on 5/5/2025), Disp: , Rfl:      Objective     Review of Systems    Physical Exam  Physical Exam      Results  Labs   - HDL cholesterol: Slightly low   - LDL cholesterol: Less than 100   - Alkaline phosphatase: Slightly elevated   - Hemoglobin A1c: Lower than it was previously    Assessment & Plan    Problems Addressed this Visit          Cardiac and Vasculature    Hypercholesterolemia - Primary    Essential hypertension       Endocrine and Metabolic    Obesity (BMI 30.0-34.9)    Hyperglycemia     Diagnoses         Codes Comments      Hypercholesterolemia    -  Primary ICD-10-CM: E78.00  ICD-9-CM: 272.0       Essential hypertension     ICD-10-CM: I10  ICD-9-CM: 401.9       Obesity (BMI 30.0-34.9)     ICD-10-CM: E66.811  ICD-9-CM: 278.00       Hyperglycemia     ICD-10-CM: R73.9  ICD-9-CM: 790.29           Assessment &  Plan  1. Hyperlipidemia.  - HDL levels are marginally low, consistent with his usual pattern. The target for HDL is >40.  - LDL levels have shown significant improvement, currently <100, which is the desired range.  - Total cholesterol has decreased by 80 points, and LDL cholesterol has reduced by 90 points. Slight elevation in alkaline phosphatase, likely due to rosuvastatin therapy initiated in 2023.  - Continue with rosuvastatin 10 mg daily. Consider using a pill cutter to halve the dose if desired.    2. Elevated blood glucose.  - Hemoglobin A1c levels have decreased, indicating average blood glucose levels are within the normal range.  - Blood glucose levels were elevated on the day of testing.  - Advised to focus on weight loss and regular exercise to manage blood glucose levels.    3. Weight management.  - Achieved a weight below 250 pounds for the first time in 10 years.  - Previously used compounded semaglutide but discontinued due to FDA regulation changes and cost concerns.  - Advised to explore affordable options for semaglutide or Wegovy through local pharmacies or [facility].    Follow-up  - Follow up in 6 months.  This visit should be for an annual physical exam.  Fasting labs prior to the visit should include: Hemoglobin A1c, comprehensive metabolic panel, CBC, lipid profile, TSH with reflex free T4, vitamin D level      No follow-ups on file.    Patient or patient representative verbalized consent for the use of Ambient List[employer] during the visit with  Owen Arellano MD for chart documentation. 5/6/2025  15:41 EDT

## 2025-06-02 RX ORDER — ROSUVASTATIN CALCIUM 20 MG/1
20 TABLET, COATED ORAL DAILY
Qty: 90 TABLET | Refills: 1 | Status: SHIPPED | OUTPATIENT
Start: 2025-06-02